# Patient Record
Sex: MALE | Race: BLACK OR AFRICAN AMERICAN | Employment: FULL TIME | URBAN - METROPOLITAN AREA
[De-identification: names, ages, dates, MRNs, and addresses within clinical notes are randomized per-mention and may not be internally consistent; named-entity substitution may affect disease eponyms.]

---

## 2020-04-29 ENCOUNTER — OFFICE VISIT - HEALTHEAST (OUTPATIENT)
Dept: FAMILY MEDICINE | Facility: CLINIC | Age: 25
End: 2020-04-29

## 2020-04-29 DIAGNOSIS — R11.0 NAUSEA: ICD-10-CM

## 2020-04-29 DIAGNOSIS — R10.31 RLQ ABDOMINAL PAIN: ICD-10-CM

## 2020-04-29 LAB
ALBUMIN UR-MCNC: NEGATIVE MG/DL
APPEARANCE UR: CLEAR
BACTERIA #/AREA URNS HPF: ABNORMAL HPF
BASOPHILS # BLD AUTO: 0 THOU/UL (ref 0–0.2)
BASOPHILS NFR BLD AUTO: 1 % (ref 0–2)
BILIRUB UR QL STRIP: NEGATIVE
COLOR UR AUTO: YELLOW
EOSINOPHIL # BLD AUTO: 0.1 THOU/UL (ref 0–0.4)
EOSINOPHIL NFR BLD AUTO: 1 % (ref 0–6)
ERYTHROCYTE [DISTWIDTH] IN BLOOD BY AUTOMATED COUNT: 11.1 % (ref 11–14.5)
GLUCOSE UR STRIP-MCNC: NEGATIVE MG/DL
HCT VFR BLD AUTO: 46.1 % (ref 40–54)
HGB BLD-MCNC: 16.1 G/DL (ref 14–18)
HGB UR QL STRIP: NEGATIVE
KETONES UR STRIP-MCNC: NEGATIVE MG/DL
LEUKOCYTE ESTERASE UR QL STRIP: ABNORMAL
LYMPHOCYTES # BLD AUTO: 2.6 THOU/UL (ref 0.8–4.4)
LYMPHOCYTES NFR BLD AUTO: 49 % (ref 20–40)
MCH RBC QN AUTO: 32.3 PG (ref 27–34)
MCHC RBC AUTO-ENTMCNC: 34.9 G/DL (ref 32–36)
MCV RBC AUTO: 92 FL (ref 80–100)
MONOCYTES # BLD AUTO: 0.4 THOU/UL (ref 0–0.9)
MONOCYTES NFR BLD AUTO: 8 % (ref 2–10)
NEUTROPHILS # BLD AUTO: 2.2 THOU/UL (ref 2–7.7)
NEUTROPHILS NFR BLD AUTO: 41 % (ref 50–70)
NITRATE UR QL: NEGATIVE
PH UR STRIP: 6 [PH] (ref 5–8)
PLATELET # BLD AUTO: 237 THOU/UL (ref 140–440)
PMV BLD AUTO: 7.2 FL (ref 7–10)
RBC # BLD AUTO: 4.99 MILL/UL (ref 4.4–6.2)
RBC #/AREA URNS AUTO: ABNORMAL HPF
SP GR UR STRIP: 1.01 (ref 1–1.03)
SQUAMOUS #/AREA URNS AUTO: ABNORMAL LPF
UROBILINOGEN UR STRIP-ACNC: ABNORMAL
WBC #/AREA URNS AUTO: ABNORMAL HPF
WBC: 5.3 THOU/UL (ref 4–11)

## 2020-04-30 LAB — BACTERIA SPEC CULT: NO GROWTH

## 2021-05-01 ENCOUNTER — HEALTH MAINTENANCE LETTER (OUTPATIENT)
Age: 26
End: 2021-05-01

## 2021-06-04 VITALS
DIASTOLIC BLOOD PRESSURE: 83 MMHG | SYSTOLIC BLOOD PRESSURE: 133 MMHG | WEIGHT: 180 LBS | TEMPERATURE: 97.9 F | RESPIRATION RATE: 16 BRPM | OXYGEN SATURATION: 98 % | HEART RATE: 85 BPM

## 2021-06-18 NOTE — PATIENT INSTRUCTIONS - HE
Patient Instructions by Vincenzo Okeefe CNP at 4/29/2020  8:00 AM     Author: Vincenzo Okeefe CNP Service: -- Author Type: Nurse Practitioner    Filed: 4/29/2020  8:41 AM Encounter Date: 4/29/2020 Status: Signed    : Vincenzo Okeefe CNP (Nurse Practitioner)         Patient Education     Abdominal Pain, Possible Appendicitis, Repeat Exam, Male    Based on your visit today, the exact cause of your abdominal (stomach) pain is not certain. You have some of the early symptoms of appendicitis. Because these symptoms can be similar to other stomach problems, however, the diagnosis cannot be made at this time.  Waiting for more time to pass and repeating the exam is the best way to find out whether you have appendicitis. Within the next 12 to 24 hours, the cause of your stomach pain should become clear. During this time, you need to watch for any new symptoms or worsening of your condition. (See below.)  Symptoms  The most common symptom of appendicitis is pain in the abdomen. Since the appendix is in the lower right part of the abdomen, that is the most common place to have pain. Typically, the pain starts near the navel (belly button) and then moves lower and to the right over hours. The pain also tends to worsen over time. It may hurt especially when moving, straining, or coughing.  Other symptoms include:    Loss of appetite    Nausea, vomiting    Low-grade fever    Constipation or diarrhea    Not passing gas  Home care    Rest until your next exam. No lifting, straining, or strenuous activities.    You may be told not to eat or drink anything before your next exam. Be sure to follow any instructions youre given. If you are allowed to eat:  ? Eat a diet low in fiber (called a low-residue diet). Foods allowed include refined breads, white rice, fruit and vegetable juices without pulp, and tender meats. These foods will pass more easily through the colon.  ? Don't eat whole-grain foods, whole fruits and  vegetables, meats, seeds and nuts, fried or fatty foods, dairy, alcohol, or spicy foods.   Follow-up care  Return for your next exam as advised.  When to seek medical advice  Call your healthcare provider treatment right away if any of these occur:    Fever of 100.4 F (38 C) or higher, or as directed by your provider    Pain gets worse or moves to the lower right part of your abdomen    Nausea or vomiting gets worse    Diarrhea or constipation gets worse    Blood in your vomit or stool (dark red or black color)    Belly (abdomen) becomes swollen    Weakness or dizziness  Call 911  Call 911 right away if:    You have trouble breathing.    You become very confused or sleepy.    You feel faint or lose consciousness.    You have an usually fast heart rate.  Date Last Reviewed: 6/24/2015 2000-2017 [employer]. [address] [place] [address], [place] [address]. All rights reserved. This information is not intended as a substitute for professional medical care. Always follow your healthcare professional's instructions.

## 2021-06-29 NOTE — PROGRESS NOTES
Progress Notes by Vincenzo Okeefe CNP at 4/29/2020  8:00 AM     Author: Vincenzo Okeefe CNP Service: -- Author Type: Nurse Practitioner    Filed: 4/29/2020  9:04 AM Encounter Date: 4/29/2020 Status: Signed    : Vincenzo Okeefe CNP (Nurse Practitioner)       Chief Complaint   Patient presents with   ? Abdominal Pain     on and off since around 4-5 am today, pain starts by belly buttom and radiates to sternum while lying on stomach       HPI:  Jorge Alberto Solorio is a 25 y.o. male who presents today complaining of severe abdominal pain that began early morning hours today, awaking him from sleep, he rates this discomfort a 6-7/10 on the numeric pain scale. He describes the pain as a stabbing, shooting pain.     Reports he normally sleeps on abdomen and pain has continued to radiate from umbilicus to left and right lower quadrant and upwards to epigastric area. Reports intermittent nausea this morning, no emesis. Denies any OTC medications for discomfort.     Reports no known abdominal surgeries or injuries. Past medical history negative for chronic abdominal concerns. Denies any hematemesis or hemeatochezia symptoms.      History obtained from the patient.    Problem List:  There are no relevant problems documented for this patient.      History reviewed. No pertinent past medical history.    Social History     Tobacco Use   ? Smoking status: Never Smoker   ? Smokeless tobacco: Never Used   Substance Use Topics   ? Alcohol use: Not on file       Review of Systems   Constitutional: Positive for appetite change. Negative for activity change, chills and fever.   Gastrointestinal: Positive for abdominal distention, abdominal pain, constipation and nausea. Negative for blood in stool, diarrhea and vomiting.   Genitourinary: Negative for difficulty urinating and dysuria.   All other systems reviewed and are negative.      Vitals:    04/29/20 0746   BP: 133/83   Patient Site: Right Arm   Patient Position: Sitting    Cuff Size: Adult Regular   Pulse: 85   Resp: 16   Temp: 97.9  F (36.6  C)   TempSrc: Oral   SpO2: 98%   Weight: 180 lb (81.6 kg)       Physical Exam  Constitutional:       General: He is not in acute distress.     Appearance: Normal appearance. He is not ill-appearing, toxic-appearing or diaphoretic.   Cardiovascular:      Rate and Rhythm: Normal rate and regular rhythm.      Pulses: Normal pulses.      Heart sounds: Normal heart sounds.   Pulmonary:      Effort: Pulmonary effort is normal.      Breath sounds: Normal breath sounds.   Abdominal:      General: Bowel sounds are normal. There is distension.      Palpations: Abdomen is soft. There is no mass.      Tenderness: There is abdominal tenderness. There is guarding. There is no right CVA tenderness, left CVA tenderness or rebound.      Comments: Localized tenderness to RLQ and radiates to umbilicus with light palpation, slight guarding with deep palpation   Skin:     General: Skin is warm.      Capillary Refill: Capillary refill takes less than 2 seconds.      Coloration: Skin is not pale.      Findings: No rash.   Neurological:      General: No focal deficit present.      Mental Status: He is alert and oriented to person, place, and time. Mental status is at baseline.   Psychiatric:         Mood and Affect: Mood normal.         No notes on file    Labs:  Recent Results (from the past 72 hour(s))   Urinalysis-UC if Indicated   Result Value Ref Range    Color, UA Yellow Colorless, Yellow, Straw, Light Yellow    Clarity, UA Clear Clear    Glucose, UA Negative Negative    Bilirubin, UA Negative Negative    Ketones, UA Negative Negative    Specific Gravity, UA 1.010 1.005 - 1.030    Blood, UA Negative Negative    pH, UA 6.0 5.0 - 8.0    Protein, UA Negative Negative mg/dL    Urobilinogen, UA 0.2 E.U./dL 0.2 E.U./dL, 1.0 E.U./dL    Nitrite, UA Negative Negative    Leukocytes, UA Trace (!) Negative    Bacteria, UA None Seen None Seen hpf    RBC, UA None Seen None  Seen, 0-2 hpf    WBC, UA 0-5 None Seen, 0-5 hpf    Squam Epithel, UA None Seen None Seen, 0-5 lpf   HM1 (CBC with Diff)   Result Value Ref Range    WBC 5.3 4.0 - 11.0 thou/uL    RBC 4.99 4.40 - 6.20 mill/uL    Hemoglobin 16.1 14.0 - 18.0 g/dL    Hematocrit 46.1 40.0 - 54.0 %    MCV 92 80 - 100 fL    MCH 32.3 27.0 - 34.0 pg    MCHC 34.9 32.0 - 36.0 g/dL    RDW 11.1 11.0 - 14.5 %    Platelets 237 140 - 440 thou/uL    MPV 7.2 7.0 - 10.0 fL    Neutrophils % 41 (L) 50 - 70 %    Lymphocytes % 49 (H) 20 - 40 %    Monocytes % 8 2 - 10 %    Eosinophils % 1 0 - 6 %    Basophils % 1 0 - 2 %    Neutrophils Absolute 2.2 2.0 - 7.7 thou/uL    Lymphocytes Absolute 2.6 0.8 - 4.4 thou/uL    Monocytes Absolute 0.4 0.0 - 0.9 thou/uL    Eosinophils Absolute 0.1 0.0 - 0.4 thou/uL    Basophils Absolute 0.0 0.0 - 0.2 thou/uL       Radiology: None obtained    Clinical Decision Making: At the end of the encounter, I discussed laboratory results, concern for appendicitis or other acute intraabdominal process given clinical presentation. Patient advised to proceed to ED now for further evaluation. Wife to transport via personal car to [facility]. Patient understood and agreed to plan.       SAMUEL Stewart, CNP     1. RLQ abdominal pain  HM1(CBC and Differential)    Urinalysis-UC if Indicated    HM1 (CBC with Diff)    Culture, Urine   2. Nausea           Patient Instructions     Patient Education     Abdominal Pain, Possible Appendicitis, Repeat Exam, Male    Based on your visit today, the exact cause of your abdominal (stomach) pain is not certain. You have some of the early symptoms of appendicitis. Because these symptoms can be similar to other stomach problems, however, the diagnosis cannot be made at this time.  Waiting for more time to pass and repeating the exam is the best way to find out whether you have appendicitis. Within the next 12 to 24 hours, the cause of your stomach pain should become clear. During this time, you need  to watch for any new symptoms or worsening of your condition. (See below.)  Symptoms  The most common symptom of appendicitis is pain in the abdomen. Since the appendix is in the lower right part of the abdomen, that is the most common place to have pain. Typically, the pain starts near the navel (belly button) and then moves lower and to the right over hours. The pain also tends to worsen over time. It may hurt especially when moving, straining, or coughing.  Other symptoms include:    Loss of appetite    Nausea, vomiting    Low-grade fever    Constipation or diarrhea    Not passing gas  Home care    Rest until your next exam. No lifting, straining, or strenuous activities.    You may be told not to eat or drink anything before your next exam. Be sure to follow any instructions youre given. If you are allowed to eat:  ? Eat a diet low in fiber (called a low-residue diet). Foods allowed include refined breads, white rice, fruit and vegetable juices without pulp, and tender meats. These foods will pass more easily through the colon.  ? Don't eat whole-grain foods, whole fruits and vegetables, meats, seeds and nuts, fried or fatty foods, dairy, alcohol, or spicy foods.   Follow-up care  Return for your next exam as advised.  When to seek medical advice  Call your healthcare provider treatment right away if any of these occur:    Fever of 100.4 F (38 C) or higher, or as directed by your provider    Pain gets worse or moves to the lower right part of your abdomen    Nausea or vomiting gets worse    Diarrhea or constipation gets worse    Blood in your vomit or stool (dark red or black color)    Belly (abdomen) becomes swollen    Weakness or dizziness  Call 911  Call 911 right away if:    You have trouble breathing.    You become very confused or sleepy.    You feel faint or lose consciousness.    You have an usually fast heart rate.  Date Last Reviewed: 6/24/2015 2000-2017 [employer]. [address], [place] [address]. All rights reserved. This information is not intended as a substitute for professional medical care. Always follow your healthcare professional's instructions.

## 2021-09-15 ENCOUNTER — OFFICE VISIT (OUTPATIENT)
Dept: FAMILY MEDICINE | Facility: CLINIC | Age: 26
End: 2021-09-15
Payer: COMMERCIAL

## 2021-09-15 VITALS
RESPIRATION RATE: 16 BRPM | BODY MASS INDEX: 26.96 KG/M2 | HEART RATE: 72 BPM | WEIGHT: 182 LBS | HEIGHT: 69 IN | DIASTOLIC BLOOD PRESSURE: 70 MMHG | SYSTOLIC BLOOD PRESSURE: 128 MMHG | OXYGEN SATURATION: 98 % | TEMPERATURE: 97.2 F

## 2021-09-15 DIAGNOSIS — Z00.00 ROUTINE GENERAL MEDICAL EXAMINATION AT A HEALTH CARE FACILITY: Primary | ICD-10-CM

## 2021-09-15 PROCEDURE — 99395 PREV VISIT EST AGE 18-39: CPT | Performed by: FAMILY MEDICINE

## 2021-09-15 ASSESSMENT — ENCOUNTER SYMPTOMS
FEVER: 0
SHORTNESS OF BREATH: 0
COUGH: 0
HEMATURIA: 0
WEAKNESS: 0
CONSTIPATION: 0
CHILLS: 0
DYSURIA: 0
DIZZINESS: 0
NERVOUS/ANXIOUS: 0
PARESTHESIAS: 0
EYE PAIN: 0
ARTHRALGIAS: 0
SORE THROAT: 0
HEARTBURN: 0
DIARRHEA: 0
HEMATOCHEZIA: 0
JOINT SWELLING: 0
HEADACHES: 0
FREQUENCY: 0
ABDOMINAL PAIN: 0
PALPITATIONS: 0
NAUSEA: 0
MYALGIAS: 0

## 2021-09-15 ASSESSMENT — MIFFLIN-ST. JEOR: SCORE: 1794.89

## 2021-09-15 NOTE — PROGRESS NOTES
SUBJECTIVE:   CC: Jorge Alberto Solorio is an 26 year old male who presents for preventative health visit.       Patient has been advised of split billing requirements and indicates understanding: Yes  Healthy Habits:     Getting at least 3 servings of Calcium per day:  Yes    Bi-annual eye exam:  Yes    Dental care twice a year:  Yes    Sleep apnea or symptoms of sleep apnea:  None    Diet:  Regular (no restrictions)    Frequency of exercise:  4-5 days/week    Duration of exercise:  30-45 minutes    Taking medications regularly:  No    Medication side effects:  None    PHQ-2 Total Score: 0    Additional concerns today:  No       26-year-old male who presents to clinic for annual exam and to establish care.    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   He is an exercise physiologist.     Immunizations: will get flu vaccine through [employer].   Cholesterol: No family history of severely elevated cholesterol. Screen age 35   Diabetes: no indication to screen at this time.   Colon Cancer: No early family history of colon cancer.   HIV screen: Deferred low risk   Hepatitis C screen: Deferred low risk   Diet/Exercise: active.    Tobacco: non-user.     Alcohol: will drink a couple glasses of wine on the weekends. No issues.   Exercise: does cardio and also strength training.       Today's PHQ-2 Score:   PHQ-2 ( 1999 [employer]) 9/15/2021   Q1: Little interest or pleasure in doing things 0   Q2: Feeling down, depressed or hopeless 0   PHQ-2 Score 0   Q1: Little interest or pleasure in doing things Not at all   Q2: Feeling down, depressed or hopeless Not at all   PHQ-2 Score 0       Abuse: Current or Past(Physical, Sexual or Emotional)- No  Do you feel safe in your environment? Yes    Have you ever done Advance Care Planning? (For example, a Health Directive, POLST, or a discussion with a medical provider or your loved ones about your wishes): No, advance care planning information given to patient to  "review.  Patient plans to discuss their wishes with loved ones or provider.      Social History     Tobacco Use     Smoking status: Never Smoker     Smokeless tobacco: Never Used   Substance Use Topics     Alcohol use: Yes     Comment: occ     If you drink alcohol do you typically have >3 drinks per day or >7 drinks per week? No    Alcohol Use 9/15/2021   Prescreen: >3 drinks/day or >7 drinks/week? No   Prescreen: >3 drinks/day or >7 drinks/week? -     Reviewed orders with patient. Reviewed health maintenance and updated orders accordingly - Yes    Reviewed and updated as needed this visit by clinical staff  Tobacco  Allergies  Meds  Problems  Med Hx  Surg Hx  Fam Hx  Soc Hx          Reviewed and updated as needed this visit by Provider  Tobacco  Allergies  Meds  Problems  Med Hx  Surg Hx  Fam Hx         Review of Systems   Constitutional: Negative for chills and fever.   HENT: Negative for congestion, ear pain, hearing loss and sore throat.    Eyes: Negative for pain and visual disturbance.   Respiratory: Negative for cough and shortness of breath.    Cardiovascular: Negative for chest pain, palpitations and peripheral edema.   Gastrointestinal: Negative for abdominal pain, constipation, diarrhea, heartburn, hematochezia and nausea.   Genitourinary: Negative for discharge, dysuria, frequency, genital sores, hematuria, impotence and urgency.   Musculoskeletal: Negative for arthralgias, joint swelling and myalgias.   Skin: Negative for rash.   Neurological: Negative for dizziness, weakness, headaches and paresthesias.   Psychiatric/Behavioral: Negative for mood changes. The patient is not nervous/anxious.        OBJECTIVE:   /70 (BP Location: Left arm, Patient Position: Chair, Cuff Size: Adult Regular)   Pulse 72   Temp 97.2  F (36.2  C) (Tympanic)   Resp 16   Ht 1.759 m (5' 9.25\")   Wt 82.6 kg (182 lb)   SpO2 98%   BMI 26.68 kg/m      Physical Exam   General: Alert, cooperative, no acute " "distress  Head: Normocephalic, atraumatic   Eyes: PERRL, no scleral icterus, no conjunctival injection   Ears: External ear without deformity, external canals patent, TM intact with no signs of infection   Nose: nares patent  Throat: Oropharynx clear, non-erythematous, tonsils non-enlarged   Neck: supple, trachea midline, no goiter   CV: RRR, no murmur   Lungs: Clear, non-labored breathing, No rales or rhonchi   Abdomen: Bowel sounds active, soft, non-tender, no guarding.   Extremities: No peripheral edema, calves non-tender   MSK: strength intact in upper and lower extremities. Gait normal.   Neuro: CN II-XII grossly intact. No focal deficits. Sensation intact.       ASSESSMENT/PLAN:   Jorge Alberto was seen today for physical.    Diagnoses and all orders for this visit:    Routine general medical examination at a health care facility  Immunizations: will get flu vaccine through employee health.   Cholesterol: No family history of severely elevated cholesterol. Screen age 35   Diabetes: no indication to screen at this time.   Colon Cancer: No early family history of colon cancer.   HIV screen: Deferred low risk   Hepatitis C screen: Deferred low risk   Diet/Exercise: active.    Tobacco: non-user.     Patient has been advised of split billing requirements and indicates understanding: Yes  COUNSELING:   Reviewed preventive health counseling, as reflected in patient instructions       Regular exercise       Healthy diet/nutrition       Family planning       Safe sex practices/STD prevention       Consider Hep C screening for all patients one time for ages 18-79 years       HIV screeninx in teen years, 1x in adult years, and at intervals if high risk    Estimated body mass index is 26.68 kg/m  as calculated from the following:    Height as of this encounter: 1.759 m (5' 9.25\").    Weight as of this encounter: 82.6 kg (182 lb).     Weight management plan: Discussed healthy diet and exercise guidelines    He reports that " he has never smoked. He has never used smokeless tobacco.      Counseling Resources:  ATP IV Guidelines  Pooled Cohorts Equation Calculator  FRAX Risk Assessment  ICSI Preventive Guidelines  Dietary Guidelines for Americans, 2010  USDA's MyPlate  ASA Prophylaxis  Lung CA Screening    [facility]

## 2021-10-16 ENCOUNTER — HEALTH MAINTENANCE LETTER (OUTPATIENT)
Age: 26
End: 2021-10-16

## 2022-01-28 ENCOUNTER — VIRTUAL VISIT (OUTPATIENT)
Dept: FAMILY MEDICINE | Facility: CLINIC | Age: 27
End: 2022-01-28
Payer: COMMERCIAL

## 2022-01-28 DIAGNOSIS — U07.1 INFECTION DUE TO 2019 NOVEL CORONAVIRUS: Primary | ICD-10-CM

## 2022-01-28 PROCEDURE — 99213 OFFICE O/P EST LOW 20 MIN: CPT | Mod: 95 | Performed by: NURSE PRACTITIONER

## 2022-01-28 RX ORDER — BENZONATATE 100 MG/1
100 CAPSULE ORAL 3 TIMES DAILY PRN
Qty: 30 CAPSULE | Refills: 0 | Status: SHIPPED | OUTPATIENT
Start: 2022-01-28 | End: 2023-03-17

## 2022-01-28 NOTE — PROGRESS NOTES
Jorge Alberto is a 26 year old who is being evaluated via a billable video visit.      How would you like to obtain your AVS? MyChart  If the video visit is dropped, the invitation should be resent by: Text to cell phone: [phone]  Will anyone else be joining your video visit? No      Video Start Time: 11:01 AM    Assessment & Plan     Infection due to 2019 novel coronavirus  Symptomatic treatments discussed.  Return criteria discussed.  - benzonatate (TESSALON) 100 MG capsule; Take 1 capsule (100 mg) by mouth 3 times daily as needed for cough             Patient Instructions     Patient Education       Coronavirus Disease 2019 (COVID-19): Caring for Yourself or Others  If you or a household member test positive for COVID-19 or have symptoms like fever, cough, fatigue, loss of taste or smell, follow these guidelines for preventing spread of the virus and managing symptoms. This is regardless of your vaccination status.  If you have COVID-19 symptoms    Stay home. Call your healthcare provider and tell them you have COVID-19 symptoms or you have tested positive for COVID-19. Do this before going to any hospital or clinic. Follow your provider's instructions. You will be advised to isolate yourself at home. This is called self-isolation. Isolate even if you don't have COVID-19 symptoms but you test positive. See the CDC's website for current, detailed information about staying home. Also, follow your local area's instructions on testing and staying home.    Stay away from work, school, and public places. Limit physical contact with family members. Limit visitors. Don't kiss anyone or share eating or drinking utensils. Clean surfaces you touch with disinfectant. This is to help prevent the virus from spreading.    If you need to cough or sneeze, do it into a tissue. Then throw the tissue into the trash. If you don't have tissues, cough or sneeze into the bend of your elbow.    Wear a cloth face mask with 2 or more  layers of washable, breathable fabric and a nose wire while in public or when indoors with people who don't live with you. Or you can wear a disposable paper mask with a cloth mask on top. Wear the mask so that it covers both your nose, mouth, and chin. Make sure the mask is snug around your nose and the sides of your face.    Don t share food or personal items with people in your household. This includes items like eating and drinking utensils, towels, and bedding.    If you need to go to a hospital or clinic, expect that the healthcare staff will wear protective equipment such as masks, gowns, gloves, and eye protection. You may be advised to wait in or enter through a separate area. This is to prevent the possible virus from spreading.    Tell the healthcare staff about recent travel. This includes local travel on public transport. Staff may need to find other people you have been in contact with.    Follow all instructions the healthcare staff give you.    If you have been diagnosed with COVID-19    Stay home and isolate. Don t leave your home unless you need to get medical care. Don't go to work, school, or public areas. Don't use public transportation or taxis.    Follow all instructions from your healthcare provider. Call your healthcare provider s office before going. They can prepare and give you instructions. This will help prevent the virus from spreading.    If you need to go to a hospital or clinic, expect that the healthcare staff will wear protective equipment such as masks, gowns, gloves, and eye protection. You may be advised to wait in or enter through a separate area. This is to prevent the possible virus from spreading.    Wear a well-fitted face mask with 2 or more layers and a nose wire. Use either a cloth mask with layers of tightly woven, breathable fabric or a disposable paper mask with a cloth mask on top. This is to protect other people from your germs. If you are not able to wear a mask,  your caregivers should. Make sure the mask is snug around your nose and the sides of your face.    Stay away from other people in your home. Stay in a separate room with its own bathroom, if possible.    Stay away from pets and other animals.    Don t share food or personal items with people in your household. This includes items like eating and drinking utensils, towels, and bedding.    If you need to cough or sneeze, do it into a tissue. Then throw the tissue into the trash. If you don't have tissues, cough or sneeze into the bend of your elbow.    Wash your hands often.    Self-care at home   Prevention  Several vaccines and booster shots are available to prevent COVID-19 or reduce its severity. These vaccines reduce how severe the illness will be if you get the virus. No vaccine is ever 100% effective in preventing any illness, but the COVID-19 vaccines work well and are safe. One vaccine is available for people as young as 5. Booster shots are available for people as young as 12. Expert groups, including ACOG and CDC, advise pregnant or breastfeeding people to be vaccinated. Talk with your healthcare provider about which vaccine is best for you and your family and when you may need a booster shot.  Treatment  Current treatment is mainly aimed at helping your body while it fights the virus. This is known as supportive care. If you have confirmed COVID-19, talk with your healthcare provider. You may qualify for certain medicines approved by the FDA to prevent severe COVID-19 infection.  For serious COVID-19, you may need to stay in the hospital. Supportive care includes:    Getting rest. This helps your body fight the illness.    Staying hydrated.  Drinking liquids is the best way to prevent dehydration. Try to drink 6 to 8 glasses of liquids every day, or as advised by your provider. Also check with your provider about which fluids are best for you. Don't drink fluids that contain caffeine or alcohol.    Taking  over-the-counter (OTC) pain medicine. These are used to help ease pain and reduce fever. Follow your healthcare provider's instructions for which OTC medicine to use.  If you've been treated for suspected or confirmed COVID-19, follow all of your healthcare team's instructions. This will include when it's OK to stop self-isolation. You may also get instructions on position changes to help your breathing, such as lying on your belly (prone positioning). If you were treated at a hospital and discharged, you may be sent home with a pulse oximeter. This is a small electronic device that you clip on your fingertip. It measures the amount of oxygen in your body. Follow your healthcare team's instructions on its use, how they will be in touch with you, and let you know when to call them.  The FDA approved monoclonal antibody therapy for emergency investigational use in certain people who have a positive COVID-19 viral test and have mild to moderate symptoms but are not in the hospital. Your healthcare provider will advise you on whether monoclonal antibody therapy is appropriate for you. It's not approved to prevent COVID-19. It's approved for people 12 years and older who weigh at least 88 pounds (about 40 kgs) and are at high risk for severe COVID-19 and a hospital stay. This includes people who are 65 years and older and people with certain chronic conditions. Monoclonal antibody therapy is not approved for people who:    Are in the hospital with COVID-19, or    Need oxygen therapy for COVID-19, or    Need oxygen therapy for a chronic condition and need to have oxygen flow increased because of COVID-19  If you've had confirmed COVID-19, your healthcare team may ask you to consider donating your plasma. This is called COVID-19 convalescent plasma donation. Plasma from people fully recovered from COVID-19 may contain antibodies to help fight COVID-19 in people who are currently seriously ill with the disease. Experts  don't know the safety of COVID-19 convalescent plasma or how well it works. Research continues. The FDA has approved it for emergency use in certain people with serious or life-threatening COVID-19.  Home care for a sick person     Follow all instructions from healthcare staff.    Wash your hands often.    Wear protective clothing as advised.    Make sure the sick person wears a mask. If they can't wear a mask, don't stay in the same room with the person. If you must be in the same room, wear a face mask. When wearing a mask, make sure that it covers both the nose and mouth.    Keep track of the sick person s symptoms.    Clean home surfaces often with disinfectant. This includes phones, kitchen counters, fridge door handle, bathroom surfaces, and others.    Don t let anyone share household items with the sick person. This includes eating and drinking tools, towels, sheets, or blankets.    Clean fabrics and laundry thoroughly.    Keep other people and pets away from the sick person.    When you can stop isolation  When you have COVID-19, with or without symptoms, you should stay away from other people. This is called isolation.  For normally healthy children or adults with COVID-19 symptoms, CDC advises stopping isolation when all 3 of these are true:  1. You have had no fever for at least 24 hours. This means no fever without medicine that reduces fever, such as acetaminophen, for at least 24 hours.  2. If you have symptoms, your symptoms such as cough or trouble breathing have improved.  3. It has been at least 5 days since your first symptoms started.  For people who have COVID-19 but no symptoms, isolation can stop 5 days after the first positive test. To calculate your 5-day isolation period, day 0 is your first day of symptoms. Day 1 is the first full day after your symptoms developed. You can leave isolation after 5 full days.  People who have tested positive (even without symptoms) should wear a well-fitting  mask 5 more days after leaving isolation. This is day 6 through day 10.  If you have a weak immune system and COVID-19, or if you've had severe COVID-19,  your instructions on when to stop isolation will be somewhat different. Some conditions and treatments can cause a weak immune system. These include cancer treatment, bone marrow or organ transplants, and conditions such as HIV or other immune system disorders. You may be advised to self-isolate up to 20 days after your symptoms first started. Your healthcare provider may want to retest you for COVID-19. Follow your provider's instructions.  CDC mask guidance  Consider the CDC's guidance and your local community's instructions on face masks.    Wear a well-fitting mask that is snug around your nose, mouth, and chin.    Choose a mask with a nose wire. Bend the wire over your nose to fit close to your face. This prevents air from leaking out of the top of the mask.    Cloth masks may help prevent people who have COVID-19 from spreading the virus to others.    Cloth masks are most likely to reduce COVID-19 spread when masks are widely used by people who are out in the public.    Use 2 layers of material for your mask. Wear a cloth mask that has multiple layers of fabric or wear a disposable mask underneath a cloth mask.    Wear a mask inside your house if you live with someone who has symptoms of COVID-19 or has tested positive for COVID-19.    CDC advises all people older than 2 who are not fully vaccinated to wear a mask and stay 6 feet away from others while in public.    CDC advises people with a weak immune system, even if fully vaccinated, to continue wearing masks and to stay 6 feet away from others while in public.    In  through grade 12 classes, CDC advises indoor masking for all teachers, staff, students ages 2 and older, and visitors to schools, regardless of vaccination status.    Like other viruses, the virus that causes COVID-19 changes  (mutates) all the time. This leads to variants that are easily spread, including the delta variant. To protect against variants, CDC advises all people over age 2, including those who are fully vaccinated, to wear a mask indoors in public settings if you are in an area of high numbers of COVID-19 cases. See the [facility]'s county data website for current transmission information in your area.  Certain people should not wear a face mask. This includes:    Children younger than 2 years old    Anyone with a health, developmental, or mental health condition that can be made worse by wearing a mask    Anyone who is unconscious or unable to remove the face covering without help  See the CDC's mask guidance.  When to call your healthcare provider  Call your healthcare provider right away if a sick person has any of these:    Trouble breathing    Pain or pressure in chest  If a sick person has any of these, call 911:    Trouble breathing that gets worse    Pain or pressure in chest that gets worse    Blue tint to lips or face    Fast or irregular heartbeat    Confusion or trouble waking    Fainting or loss of consciousness    Coughing up blood  Going home from the hospital  If you were diagnosed with COVID-19 and were recently discharged from the hospital:    Follow the instructions above for self-care and isolation.    Follow the hospital healthcare team s specific instructions.    Ask questions if anything is unclear to you. Write down answers so you remember them.  Date last modified: 1/7/2022  [place] last reviewed this educational content on 12/1/2021 2000-2022 [employer]. All rights reserved. This information is not intended as a substitute for professional medical care. Always follow your healthcare professional's instructions.               The risks, benefits and treatment options of prescribed medications or other treatments have been discussed with the patient. The patient verbalized their  understanding and should call or follow up if no improvement or if they develop further problems.    SAMUEL Nayak CNP  [facility] VAMSHI Azul is a 26 year old who presents for the following health issues     HPI       Concern for COVID-19  +covid test on Tuesday (home test)  He is more concerned about persistent cough he has had since 1/18/2022  About how many days ago did these symptoms start? 5 days  Is this your first visit for this illness? Yes  In the 14 days before your symptoms started, have you had close contact with someone with COVID-19 (Coronavirus)? No  Do you have a fever or chills? No  Are you having new or worsening difficulty breathing? No  Do you have new or worsening cough? Yes, it's a dry cough mostly;  A little productive in the morning  Have you had any new or unexplained body aches? YES  - improved  Have you experienced any of the following NEW symptoms?    Headache: YES a little bit    Sore throat: No    Loss of taste or smell: No    Chest pain: YES a little bit with cough    Diarrhea: No    Rash: No  What treatments have you tried? dayquil and mucinex-  Helps him sleep a little bit.  Who do you live with? 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 No  Do you have a way to get food/medications if quarantined? Yes, I have a friend or family member who can help me.      HAS HAD 3 DOSES OF COVID VACCINE.              Review of Systems   Constitutional, HEENT, cardiovascular, pulmonary, gi and gu systems are negative, except as otherwise noted.      Objective           Vitals:  No vitals were obtained today due to virtual visit.    Physical Exam   GENERAL: Healthy, alert and no distress  EYES: Eyes grossly normal to inspection.  No discharge or erythema, or obvious scleral/conjunctival abnormalities.  RESP: No audible wheeze, cough, or  visible cyanosis.  No visible retractions or increased work of breathing.    SKIN: Visible skin clear. No significant rash, abnormal pigmentation or lesions.  NEURO: Cranial nerves grossly intact.  Mentation and speech appropriate for age.  PSYCH: Mentation appears normal, affect normal/bright, judgement and insight intact, normal speech and appearance well-groomed.                Video-Visit Details    Type of service:  Video Visit    Video End Time:11:10 AM    Originating Location (pt. Location): Home    Distant Location (provider location):  [facility]     Platform used for Video Visit: [employer]

## 2022-01-28 NOTE — PATIENT INSTRUCTIONS
Patient Education       Coronavirus Disease 2019 (COVID-19): Caring for Yourself or Others  If you or a household member test positive for COVID-19 or have symptoms like fever, cough, fatigue, loss of taste or smell, follow these guidelines for preventing spread of the virus and managing symptoms. This is regardless of your vaccination status.  If you have COVID-19 symptoms    Stay home. Call your healthcare provider and tell them you have COVID-19 symptoms or you have tested positive for COVID-19. Do this before going to any hospital or clinic. Follow your provider's instructions. You will be advised to isolate yourself at home. This is called self-isolation. Isolate even if you don't have COVID-19 symptoms but you test positive. See the CDC's website for current, detailed information about staying home. Also, follow your local area's instructions on testing and staying home.    Stay away from work, school, and public places. Limit physical contact with family members. Limit visitors. Don't kiss anyone or share eating or drinking utensils. Clean surfaces you touch with disinfectant. This is to help prevent the virus from spreading.    If you need to cough or sneeze, do it into a tissue. Then throw the tissue into the trash. If you don't have tissues, cough or sneeze into the bend of your elbow.    Wear a cloth face mask with 2 or more layers of washable, breathable fabric and a nose wire while in public or when indoors with people who don't live with you. Or you can wear a disposable paper mask with a cloth mask on top. Wear the mask so that it covers both your nose, mouth, and chin. Make sure the mask is snug around your nose and the sides of your face.    Don t share food or personal items with people in your household. This includes items like eating and drinking utensils, towels, and bedding.    If you need to go to a hospital or clinic, expect that the healthcare staff will wear protective equipment such as  masks, gowns, gloves, and eye protection. You may be advised to wait in or enter through a separate area. This is to prevent the possible virus from spreading.    Tell the healthcare staff about recent travel. This includes local travel on public transport. Staff may need to find other people you have been in contact with.    Follow all instructions the healthcare staff give you.    If you have been diagnosed with COVID-19    Stay home and isolate. Don t leave your home unless you need to get medical care. Don't go to work, school, or public areas. Don't use public transportation or taxis.    Follow all instructions from your healthcare provider. Call your healthcare provider s office before going. They can prepare and give you instructions. This will help prevent the virus from spreading.    If you need to go to a hospital or clinic, expect that the healthcare staff will wear protective equipment such as masks, gowns, gloves, and eye protection. You may be advised to wait in or enter through a separate area. This is to prevent the possible virus from spreading.    Wear a well-fitted face mask with 2 or more layers and a nose wire. Use either a cloth mask with layers of tightly woven, breathable fabric or a disposable paper mask with a cloth mask on top. This is to protect other people from your germs. If you are not able to wear a mask, your caregivers should. Make sure the mask is snug around your nose and the sides of your face.    Stay away from other people in your home. Stay in a separate room with its own bathroom, if possible.    Stay away from pets and other animals.    Don t share food or personal items with people in your household. This includes items like eating and drinking utensils, towels, and bedding.    If you need to cough or sneeze, do it into a tissue. Then throw the tissue into the trash. If you don't have tissues, cough or sneeze into the bend of your elbow.    Wash your hands  often.    Self-care at home   Prevention  Several vaccines and booster shots are available to prevent COVID-19 or reduce its severity. These vaccines reduce how severe the illness will be if you get the virus. No vaccine is ever 100% effective in preventing any illness, but the COVID-19 vaccines work well and are safe. One vaccine is available for people as young as 5. Booster shots are available for people as young as 12. Expert groups, including ACOG and CDC, advise pregnant or breastfeeding people to be vaccinated. Talk with your healthcare provider about which vaccine is best for you and your family and when you may need a booster shot.  Treatment  Current treatment is mainly aimed at helping your body while it fights the virus. This is known as supportive care. If you have confirmed COVID-19, talk with your healthcare provider. You may qualify for certain medicines approved by the FDA to prevent severe COVID-19 infection.  For serious COVID-19, you may need to stay in the hospital. Supportive care includes:    Getting rest. This helps your body fight the illness.    Staying hydrated.  Drinking liquids is the best way to prevent dehydration. Try to drink 6 to 8 glasses of liquids every day, or as advised by your provider. Also check with your provider about which fluids are best for you. Don't drink fluids that contain caffeine or alcohol.    Taking over-the-counter (OTC) pain medicine. These are used to help ease pain and reduce fever. Follow your healthcare provider's instructions for which OTC medicine to use.  If you've been treated for suspected or confirmed COVID-19, follow all of your healthcare team's instructions. This will include when it's OK to stop self-isolation. You may also get instructions on position changes to help your breathing, such as lying on your belly (prone positioning). If you were treated at a hospital and discharged, you may be sent home with a pulse oximeter. This is a small  electronic device that you clip on your fingertip. It measures the amount of oxygen in your body. Follow your healthcare team's instructions on its use, how they will be in touch with you, and let you know when to call them.  The FDA approved monoclonal antibody therapy for emergency investigational use in certain people who have a positive COVID-19 viral test and have mild to moderate symptoms but are not in the hospital. Your healthcare provider will advise you on whether monoclonal antibody therapy is appropriate for you. It's not approved to prevent COVID-19. It's approved for people 12 years and older who weigh at least 88 pounds (about 40 kgs) and are at high risk for severe COVID-19 and a hospital stay. This includes people who are 65 years and older and people with certain chronic conditions. Monoclonal antibody therapy is not approved for people who:    Are in the hospital with COVID-19, or    Need oxygen therapy for COVID-19, or    Need oxygen therapy for a chronic condition and need to have oxygen flow increased because of COVID-19  If you've had confirmed COVID-19, your healthcare team may ask you to consider donating your plasma. This is called COVID-19 convalescent plasma donation. Plasma from people fully recovered from COVID-19 may contain antibodies to help fight COVID-19 in people who are currently seriously ill with the disease. Experts don't know the safety of COVID-19 convalescent plasma or how well it works. Research continues. The FDA has approved it for emergency use in certain people with serious or life-threatening COVID-19.  Home care for a sick person     Follow all instructions from healthcare staff.    Wash your hands often.    Wear protective clothing as advised.    Make sure the sick person wears a mask. If they can't wear a mask, don't stay in the same room with the person. If you must be in the same room, wear a face mask. When wearing a mask, make sure that it covers both the nose  and mouth.    Keep track of the sick person s symptoms.    Clean home surfaces often with disinfectant. This includes phones, kitchen counters, fridge door handle, bathroom surfaces, and others.    Don t let anyone share household items with the sick person. This includes eating and drinking tools, towels, sheets, or blankets.    Clean fabrics and laundry thoroughly.    Keep other people and pets away from the sick person.    When you can stop isolation  When you have COVID-19, with or without symptoms, you should stay away from other people. This is called isolation.  For normally healthy children or adults with COVID-19 symptoms, CDC advises stopping isolation when all 3 of these are true:  1. You have had no fever for at least 24 hours. This means no fever without medicine that reduces fever, such as acetaminophen, for at least 24 hours.  2. If you have symptoms, your symptoms such as cough or trouble breathing have improved.  3. It has been at least 5 days since your first symptoms started.  For people who have COVID-19 but no symptoms, isolation can stop 5 days after the first positive test. To calculate your 5-day isolation period, day 0 is your first day of symptoms. Day 1 is the first full day after your symptoms developed. You can leave isolation after 5 full days.  People who have tested positive (even without symptoms) should wear a well-fitting mask 5 more days after leaving isolation. This is day 6 through day 10.  If you have a weak immune system and COVID-19, or if you've had severe COVID-19,  your instructions on when to stop isolation will be somewhat different. Some conditions and treatments can cause a weak immune system. These include cancer treatment, bone marrow or organ transplants, and conditions such as HIV or other immune system disorders. You may be advised to self-isolate up to 20 days after your symptoms first started. Your healthcare provider may want to retest you for COVID-19. Follow  your provider's instructions.  CDC mask guidance  Consider the CDC's guidance and your local community's instructions on face masks.    Wear a well-fitting mask that is snug around your nose, mouth, and chin.    Choose a mask with a nose wire. Bend the wire over your nose to fit close to your face. This prevents air from leaking out of the top of the mask.    Cloth masks may help prevent people who have COVID-19 from spreading the virus to others.    Cloth masks are most likely to reduce COVID-19 spread when masks are widely used by people who are out in the public.    Use 2 layers of material for your mask. Wear a cloth mask that has multiple layers of fabric or wear a disposable mask underneath a cloth mask.    Wear a mask inside your house if you live with someone who has symptoms of COVID-19 or has tested positive for COVID-19.    CDC advises all people older than 2 who are not fully vaccinated to wear a mask and stay 6 feet away from others while in public.    CDC advises people with a weak immune system, even if fully vaccinated, to continue wearing masks and to stay 6 feet away from others while in public.    In  through grade 12 classes, CDC advises indoor masking for all teachers, staff, students ages 2 and older, and visitors to schools, regardless of vaccination status.    Like other viruses, the virus that causes COVID-19 changes (mutates) all the time. This leads to variants that are easily spread, including the delta variant. To protect against variants, CDC advises all people over age 2, including those who are fully vaccinated, to wear a mask indoors in public settings if you are in an area of high numbers of COVID-19 cases. See the CDC's county data website for current transmission information in your area.  Certain people should not wear a face mask. This includes:    Children younger than 2 years old    Anyone with a health, developmental, or mental health condition that can be made  worse by wearing a mask    Anyone who is unconscious or unable to remove the face covering without help  See the CDC's mask guidance.  When to call your healthcare provider  Call your healthcare provider right away if a sick person has any of these:    Trouble breathing    Pain or pressure in chest  If a sick person has any of these, call 911:    Trouble breathing that gets worse    Pain or pressure in chest that gets worse    Blue tint to lips or face    Fast or irregular heartbeat    Confusion or trouble waking    Fainting or loss of consciousness    Coughing up blood  Going home from the hospital  If you were diagnosed with COVID-19 and were recently discharged from the hospital:    Follow the instructions above for self-care and isolation.    Follow the hospital healthcare team s specific instructions.    Ask questions if anything is unclear to you. Write down answers so you remember them.  Date last modified: 1/7/2022  [employer] last reviewed this educational content on 12/1/2021 2000-2022 [employer]. All rights reserved. This information is not intended as a substitute for professional medical care. Always follow your healthcare professional's instructions.

## 2022-09-25 ENCOUNTER — HEALTH MAINTENANCE LETTER (OUTPATIENT)
Age: 27
End: 2022-09-25

## 2022-10-06 ENCOUNTER — VIRTUAL VISIT (OUTPATIENT)
Dept: FAMILY MEDICINE | Facility: CLINIC | Age: 27
End: 2022-10-06
Payer: COMMERCIAL

## 2022-10-06 DIAGNOSIS — U07.1 INFECTION DUE TO 2019 NOVEL CORONAVIRUS: Primary | ICD-10-CM

## 2022-10-06 PROCEDURE — 99213 OFFICE O/P EST LOW 20 MIN: CPT | Mod: CS | Performed by: INTERNAL MEDICINE

## 2022-10-06 ASSESSMENT — PAIN SCALES - GENERAL: PAINLEVEL: NO PAIN (0)

## 2022-10-06 NOTE — PROGRESS NOTES
Jorge Alberto is a 27 year old who is being evaluated via a billable video visit.      How would you like to obtain your AVS? MyChart  If the video visit is dropped, the invitation should be resent by: Text to cell phone: [phone]  Will anyone else be joining your video visit? No        Assessment & Plan   Problem List Items Addressed This Visit    None     Visit Diagnoses     Infection due to 2019 novel coronavirus    -  Primary    Relevant Medications    nirmatrelvir and ritonavir (PAXLOVID) therapy pack           No follow-ups on file.    Jailyn De La Paz DO  [facility]    Nikko Azul is a 27 year old accompanied by his self, presenting for the following health issues:  Covid Concern (Took covid test Monday at home and was positive , ) and Health Maintenance (Aware due for shots )      HPI     Chief Complaint   Patient presents with     Covid Concern     Took covid test Monday at home and was positive ,      Health Maintenance     Aware due for shots        COVID-19 Symptom Review  How many days ago did these symptoms start? 10/2    Are any of the following symptoms significant for you?    New or worsening difficulty breathing? No    Worsening cough? Yes, I am coughing up mucus.at night and dry during the day    Fever or chills? No    Headache: No    Sore throat: YES- from cough    Chest pain: No    Diarrhea: No    Body aches? No    What treatments has patient tried? Guaifenesin (mucinex) and Cough syrup tessalon capsules this is not working   Does patient live in a nursing home, group home, or shelter? No  Does patient have a way to get food/medications during quarantined? Yes, I have a friend or family member who can help me.  --son was sick with URI 10 days ago;  He had similar 'tickle in throat 1 week ago, nothing severe  --on 10/2 started having symptoms of cough and fever             No flowsheet data found.    Estimated body mass index is 26.68 kg/m  as calculated from  "the following:    Height as of 9/15/21: 1.759 m (5' 9.25\").    Weight as of 9/15/21: 82.6 kg (182 lb).     GFR Estimate   Date Value Ref Range Status   04/29/2020 >60 >60 mL/min/1.73m2 Final        FDA Facts Sheet  [employer] Drug Interaction review    Medications were reviewed with the patient and held or adjusted where applicable.    Current Outpatient Medications   Medication     benzonatate (TESSALON) 100 MG capsule     No current facility-administered medications for this visit.         -               Review of Systems   Constitutional, HEENT, cardiovascular, pulmonary, gi and gu systems are negative, except as otherwise noted.      Objective    Vitals - Patient Reported  Pain Score: No Pain (0)      Vitals:  No vitals were obtained today due to virtual visit.    Physical Exam   GENERAL: alert, no distress, fatigued and sounds congested  EYES: Eyes grossly normal to inspection.  No discharge or erythema, or obvious scleral/conjunctival abnormalities.  RESP: No audible wheeze, cough, or visible cyanosis.  No visible retractions or increased work of breathing.    SKIN: Visible skin clear. No significant rash, abnormal pigmentation or lesions.  NEURO: Cranial nerves grossly intact.  Mentation and speech appropriate for age.  PSYCH: Mentation appears normal, affect normal/bright, judgement and insight intact, normal speech and appearance well-groomed.                Video-Visit Details    Video Start Time: 8:14 AM    Type of service:  Video Visit    Video End Time:8:24 AM    Originating Location (pt. Location): Home    Distant Location (provider location):  [facility]     Platform used for Video Visit: Marge"

## 2022-10-25 ENCOUNTER — OFFICE VISIT (OUTPATIENT)
Dept: URGENT CARE | Facility: URGENT CARE | Age: 27
End: 2022-10-25
Payer: COMMERCIAL

## 2022-10-25 VITALS
SYSTOLIC BLOOD PRESSURE: 123 MMHG | TEMPERATURE: 98.6 F | HEART RATE: 75 BPM | DIASTOLIC BLOOD PRESSURE: 75 MMHG | WEIGHT: 185 LBS | BODY MASS INDEX: 27.12 KG/M2 | OXYGEN SATURATION: 99 %

## 2022-10-25 DIAGNOSIS — J02.9 VIRAL PHARYNGITIS: Primary | ICD-10-CM

## 2022-10-25 DIAGNOSIS — J06.9 VIRAL UPPER RESPIRATORY INFECTION: ICD-10-CM

## 2022-10-25 LAB
DEPRECATED S PYO AG THROAT QL EIA: NEGATIVE
FLUAV AG SPEC QL IA: NEGATIVE
FLUBV AG SPEC QL IA: NEGATIVE
GROUP A STREP BY PCR: NOT DETECTED
SARS-COV-2 RNA RESP QL NAA+PROBE: NEGATIVE

## 2022-10-25 PROCEDURE — 87651 STREP A DNA AMP PROBE: CPT

## 2022-10-25 PROCEDURE — 87804 INFLUENZA ASSAY W/OPTIC: CPT

## 2022-10-25 PROCEDURE — U0003 INFECTIOUS AGENT DETECTION BY NUCLEIC ACID (DNA OR RNA); SEVERE ACUTE RESPIRATORY SYNDROME CORONAVIRUS 2 (SARS-COV-2) (CORONAVIRUS DISEASE [COVID-19]), AMPLIFIED PROBE TECHNIQUE, MAKING USE OF HIGH THROUGHPUT TECHNOLOGIES AS DESCRIBED BY CMS-2020-01-R: HCPCS

## 2022-10-25 PROCEDURE — U0005 INFEC AGEN DETEC AMPLI PROBE: HCPCS

## 2022-10-25 PROCEDURE — 99213 OFFICE O/P EST LOW 20 MIN: CPT | Mod: CS

## 2022-10-25 NOTE — PROGRESS NOTES
URGENT CARE  Assessment & Plan   Assessment:   Jorge Alberto Solorio is a 27 year old male who's clinical presentation today is consistent with:   1. Viral pharyngitis  - Streptococcus A Rapid Screen w/Reflex to PCR - Clinic Collect  - Group A Streptococcus PCR Throat Swab  - Symptomatic; Unknown COVID-19 Virus (Coronavirus) by PCR Nose  - Influenza A & B Antigen - Clinic Collect    No alarm signs or symptoms present   Differential Diagnoses for this patient's CC include    Bacterial vs viral etiology of URI    Covid, influenza, pharyngitis, pneumonia, bronchitis,    Common cold, allergic rhinitis, seasonal allergies    ABRS, viral sinusitis, cough, pertussis,   Mononucleosis, tonsillitis, chronic sinusitis, meningococcal disease    Plan:  Will treat patient with supportive and symptomatic measures for viral pharyngitis at this time which include: Fluids, rest, over-the-counter medications; , decongestants, antihistamines,  and expectorants, side effects of medications reviewed. Educated patient that honey, warm fluids and gargling saltwater can also help  additionally tested for bacterial cause and rapid test was negative for streptococcal A; PCR test pending (updated pt results will come via mychart/call and rx will be reflexed if positive), additionally, educated patient to monitor their symptoms for improvement over the next week and to return if the sore throat and/or tonsil swelling does not improve. discussed the possibility of mono, refused testing today. Will test for influenza and covid and follow up with pt with results as soon as they become available   Patient  is  agreeable to treatment plan and state they will follow-up if symptoms do not improve and/or if symptoms worsen  Medications ordered are listed below, please see AVS for patient's specific and personalized discharge instructions given     SAMUEL Gong [facility]  "BRANCH      ______________________________________________________________________        Subjective  Subjective     HPI: Jorge Alberto Solorio is a 27 year old  male who presents today for evaluation of the above noted concern(s).   Patient endorses a sore throat today which started 1 day ago on 10/24/22   Patient reports he recently had covid and took paxlovid about two weeks ago, and now he feels \"sick\" again with similar symptoms.   Patient endorses his most bothersome symptom is his sore throat and his body aches.    Patient denies any sweats, chills, wheezing, shortness of breath, difficulty breathing, chest pain, weakness or signs of dehydration   additionally patient denies a history of asthma or any other past medical history of breathing conditions    No Known Allergies  There is no problem list on file for this patient.      Review of Systems:  Pertinent review of systems as reflected in HPI, otherwise negative.     Objective  Objective    Physical Exam:  Vitals:    10/25/22 1735   BP: 123/75   Pulse: 75   Temp: 98.6  F (37  C)   TempSrc: Tympanic   SpO2: 99%   Weight: 83.9 kg (185 lb)      General: Alert and oriented, no acute distress, Vital signs reviewed: afebrile,  normotensive   Psy/mental status: Cooperative, nonanxious  SKIN: Intact, no rashes  EYES: EOMs intact, PERRLA bilaterally   Conjunctiva: Clear bilaterally, no injection or erythema present  EARS: TMs intact, translucent gray in color with normal landmarks present no erythema  or bulging tympanic membrane   Canals are without swelling, however have a mild amount of cerumen, no impaction  NOSE:  mucosa erythematous bilaterally with a mild amount of rhinorrhea, clear  discharge               No frontal or maxillary sinus tenderness present bilaterally  MOUTH/THROAT: lips, tongue, & oral mucosa appear normal upon inspection                Posterior oropharynx is erythematous and edematous but without exudate or lesions   edema  NECK: supple, has " full range of motion with no meningeal signs              No lymphadenopathy present  LUNG: normal work of breathing, good respiratory effort without retractions, good air  movement, non labored, inspection reveals normal chest expansion w/  inspiration            Lung sounds are clear to auscultation bilaterally,            No rales/rhonic/crackles wheezing noted           No cough noted       LABS:   Results for orders placed or performed in visit on 10/25/22   Streptococcus A Rapid Screen w/Reflex to PCR - Clinic Collect     Status: Normal    Specimen: Throat; Swab   Result Value Ref Range    Group A Strep antigen Negative Negative         I explained my diagnostic considerations and recommendations to the patient, who voiced understanding and agreement with the treatment plan.   All questions were answered.   We discussed potential side effects, risks and benefits of any prescribed or recommended therapies, as well as expectations for response to treatments.  Please see AVS for any patient instructions & handouts given.   Patient was advised to contact the Nurse Care Line, their Primary Care provider, Urgent Care, or the Emergency Department if there are new or worsening symptoms, or call 911 for emergencies.        ______________________________________________________________________          Patient Instructions   Diagnosis:  viral pharyngitis, viral Upper respiratory infection   Today we did:  Throat swab - negative for strep- rapid   PcR pending - will result to mychart tomorrow    Influenza and covid - pending - mychart     Plan:       NEGATIVE  NEGATIVE STREP TEST - VIRAL Pharyngitis aka  viral sore throat   1. Strep test was negative.  2. Monitor for improvement vs worsening  o Possibly need throat culture vs other testing (mono)     Viruses causing pharyngitis usually lasts anywhere from 10-14 days     Treatment To DOs:   Fluids: you need to drink lots of fluids: water, electrolytes, broth    Rest: you  need lots and lots of rest to get better, you have permission to sleep all day and stay home from work or school until you feel better   Fever/ body aches: ibuprofen (>6 months ) or Tylenol (> 3 month)     Symptoms: - what is your most bothersome symptom?     sore throat:   gargle saltwater, honey, warm fluids          cough drops or lozenges:    Cepacol Lozenge / Benzocaine     Children >5yrs old : one lozenge every 2 hours   Herbal:    - honey = good for cough suppressant    - zinc = 2-3mg lozenge Q2hrs    - menthol vapor rup on chest before sleep   Nasal congestion:           Decongestants:                > Pseudoephedrine (Sudafed) 60mg every 4 hours (not before bedtime)      Children >4yrs old: 15mg every 4hours chew (1mg/kg every 6hrs)       Liquid: Children's Silfedrine: 15 mg/5 mL      children >2 years old Phenylephrine (sudafed PE child)             Antihistamines:    > chlorpheniramine 4mg Q4hrs -or- clemastine 1mg twice a day (no more than 7 days)      Children >2yrs old: Claritin or zyrtec      >> add ibuprofen or tylenol for added effect   cough:          antitussives :: suppresses cough by topical anesthetic action on the respiratory stretch receptor    tessalon perles / Benzonatate - need prescription      >only in children >10yrs of age          Expectorants  ::increase respiratory tract clearance of mucus by adding hydration/viscosity causing thinning and loosening   Guaifenesin AND Dextromethorphan :: [adds cough Suppressant] inhibits cough reflex by decreasing cough receptors (relieves the irritating  dry cough)   Robitussin DM / Mucinex DM   Guaifenesin 200 to 400 mg // dextromethorphan 10 to 20 mg every 4 hours,   Combo tabs: 1-2 tabs every 12hrs         Children 4yr to <6 years: Dextromethorphan 2.5 to 5 mg with Guaifenesin 50 to 100 mg every  4 hours as needed   }    Monitor for:     Fever of 101 F or higher that is not resolving w/ tylenol or ibuprofen     Difficulty breathing: shortness of  breath, wheezing, chest pain with breathing     Signs of dehydration: Feeling weak, dizzy or that you might faint    Difficult or painful swallowing that is preventing you from eating or drinking     A skin rash - little red bumps on  your skin   1. Trouble breathing or catching your breath  2. Drooling and problems swallowing  3. Wheezing  4. Unable to talk  5. Feeling dizzy or faint  6. Feeling of doom

## 2022-10-25 NOTE — PATIENT INSTRUCTIONS
Diagnosis:  viral pharyngitis, viral Upper respiratory infection   Today we did:  Throat swab - negative for strep- rapid   PcR pending - will result to mychart tomorrow    Influenza and covid - pending - mychart     Plan:       NEGATIVE  NEGATIVE STREP TEST - VIRAL Pharyngitis aka  viral sore throat   Strep test was negative.  Monitor for improvement vs worsening  Possibly need throat culture vs other testing (mono)     Viruses causing pharyngitis usually lasts anywhere from 10-14 days     Treatment To DOs:   Fluids: you need to drink lots of fluids: water, electrolytes, broth    Rest: you need lots and lots of rest to get better, you have permission to sleep all day and stay home from work or school until you feel better   Fever/ body aches: ibuprofen (>6 months ) or Tylenol (> 3 month)     Symptoms: - what is your most bothersome symptom?     sore throat:   gargle saltwater, honey, warm fluids          cough drops or lozenges:    Cepacol Lozenge / Benzocaine     Children >5yrs old : one lozenge every 2 hours   Herbal:    - honey = good for cough suppressant    - zinc = 2-3mg lozenge Q2hrs    - menthol vapor rup on chest before sleep   Nasal congestion:           Decongestants:                > Pseudoephedrine (Sudafed) 60mg every 4 hours (not before bedtime)      Children >4yrs old: 15mg every 4hours chew (1mg/kg every 6hrs)       Liquid: Children's Silfedrine: 15 mg/5 mL      children >2 years old Phenylephrine (sudafed PE child)             Antihistamines:    > chlorpheniramine 4mg Q4hrs -or- clemastine 1mg twice a day (no more than 7 days)      Children >2yrs old: Claritin or zyrtec      >> add ibuprofen or tylenol for added effect   cough:          antitussives :: suppresses cough by topical anesthetic action on the respiratory stretch receptor    tessalon perles / Benzonatate - need prescription      >only in children >10yrs of age          Expectorants  ::increase respiratory tract clearance of mucus by  adding hydration/viscosity causing thinning and loosening   Guaifenesin AND Dextromethorphan :: [adds cough Suppressant] inhibits cough reflex by decreasing cough receptors (relieves the irritating  dry cough)   Robitussin DM / Mucinex DM   Guaifenesin 200 to 400 mg // dextromethorphan 10 to 20 mg every 4 hours,   Combo tabs: 1-2 tabs every 12hrs         Children 4yr to <6 years: Dextromethorphan 2.5 to 5 mg with Guaifenesin 50 to 100 mg every  4 hours as needed   }    Monitor for:   Fever of 101 F or higher that is not resolving w/ tylenol or ibuprofen   Difficulty breathing: shortness of breath, wheezing, chest pain with breathing   Signs of dehydration: Feeling weak, dizzy or that you might faint  Difficult or painful swallowing that is preventing you from eating or drinking   A skin rash - little red bumps on  your skin   Trouble breathing or catching your breath  Drooling and problems swallowing  Wheezing  Unable to talk  Feeling dizzy or faint  Feeling of doom

## 2023-02-04 ENCOUNTER — HEALTH MAINTENANCE LETTER (OUTPATIENT)
Age: 28
End: 2023-02-04

## 2023-03-16 ASSESSMENT — ENCOUNTER SYMPTOMS
PALPITATIONS: 0
CHILLS: 0
PARESTHESIAS: 0
DYSURIA: 0
EYE PAIN: 0
ABDOMINAL PAIN: 0
HEARTBURN: 0
HEADACHES: 0
DIZZINESS: 0
JOINT SWELLING: 0
DIARRHEA: 0
FEVER: 0
COUGH: 0
HEMATURIA: 0
ARTHRALGIAS: 0
NERVOUS/ANXIOUS: 0
CONSTIPATION: 0
SHORTNESS OF BREATH: 0
WEAKNESS: 0
HEMATOCHEZIA: 0
FREQUENCY: 0
NAUSEA: 0
SORE THROAT: 0
MYALGIAS: 0

## 2023-03-17 ENCOUNTER — OFFICE VISIT (OUTPATIENT)
Dept: FAMILY MEDICINE | Facility: CLINIC | Age: 28
End: 2023-03-17
Payer: COMMERCIAL

## 2023-03-17 VITALS
DIASTOLIC BLOOD PRESSURE: 70 MMHG | SYSTOLIC BLOOD PRESSURE: 110 MMHG | RESPIRATION RATE: 18 BRPM | HEIGHT: 70 IN | TEMPERATURE: 97.1 F | OXYGEN SATURATION: 98 % | WEIGHT: 188 LBS | HEART RATE: 68 BPM | BODY MASS INDEX: 26.92 KG/M2

## 2023-03-17 DIAGNOSIS — Z00.00 ROUTINE GENERAL MEDICAL EXAMINATION AT A HEALTH CARE FACILITY: Primary | ICD-10-CM

## 2023-03-17 LAB
ALBUMIN SERPL BCG-MCNC: 4.8 G/DL (ref 3.5–5.2)
ALP SERPL-CCNC: 55 U/L (ref 40–129)
ALT SERPL W P-5'-P-CCNC: 21 U/L (ref 10–50)
ANION GAP SERPL CALCULATED.3IONS-SCNC: 10 MMOL/L (ref 7–15)
AST SERPL W P-5'-P-CCNC: 27 U/L (ref 10–50)
BASOPHILS # BLD AUTO: 0 10E3/UL (ref 0–0.2)
BASOPHILS NFR BLD AUTO: 0 %
BILIRUB SERPL-MCNC: 0.4 MG/DL
BUN SERPL-MCNC: 15.9 MG/DL (ref 6–20)
CALCIUM SERPL-MCNC: 10 MG/DL (ref 8.6–10)
CHLORIDE SERPL-SCNC: 102 MMOL/L (ref 98–107)
CHOLEST SERPL-MCNC: 208 MG/DL
CREAT SERPL-MCNC: 1.14 MG/DL (ref 0.67–1.17)
DEPRECATED HCO3 PLAS-SCNC: 28 MMOL/L (ref 22–29)
EOSINOPHIL # BLD AUTO: 0.1 10E3/UL (ref 0–0.7)
EOSINOPHIL NFR BLD AUTO: 1 %
ERYTHROCYTE [DISTWIDTH] IN BLOOD BY AUTOMATED COUNT: 11.9 % (ref 10–15)
GFR SERPL CREATININE-BSD FRML MDRD: 90 ML/MIN/1.73M2
GLUCOSE SERPL-MCNC: 109 MG/DL (ref 70–99)
HCT VFR BLD AUTO: 46.1 % (ref 40–53)
HDLC SERPL-MCNC: 67 MG/DL
HGB BLD-MCNC: 16.1 G/DL (ref 13.3–17.7)
IMM GRANULOCYTES # BLD: 0 10E3/UL
IMM GRANULOCYTES NFR BLD: 0 %
LDLC SERPL CALC-MCNC: 129 MG/DL
LYMPHOCYTES # BLD AUTO: 3.1 10E3/UL (ref 0.8–5.3)
LYMPHOCYTES NFR BLD AUTO: 48 %
MCH RBC QN AUTO: 30.8 PG (ref 26.5–33)
MCHC RBC AUTO-ENTMCNC: 34.9 G/DL (ref 31.5–36.5)
MCV RBC AUTO: 88 FL (ref 78–100)
MONOCYTES # BLD AUTO: 0.7 10E3/UL (ref 0–1.3)
MONOCYTES NFR BLD AUTO: 10 %
NEUTROPHILS # BLD AUTO: 2.6 10E3/UL (ref 1.6–8.3)
NEUTROPHILS NFR BLD AUTO: 41 %
NONHDLC SERPL-MCNC: 141 MG/DL
NRBC # BLD AUTO: 0 10E3/UL
NRBC BLD AUTO-RTO: 0 /100
PLATELET # BLD AUTO: 250 10E3/UL (ref 150–450)
POTASSIUM SERPL-SCNC: 4.4 MMOL/L (ref 3.4–5.3)
PROT SERPL-MCNC: 7.9 G/DL (ref 6.4–8.3)
RBC # BLD AUTO: 5.22 10E6/UL (ref 4.4–5.9)
SODIUM SERPL-SCNC: 140 MMOL/L (ref 136–145)
TRIGL SERPL-MCNC: 61 MG/DL
WBC # BLD AUTO: 6.5 10E3/UL (ref 4–11)

## 2023-03-17 PROCEDURE — 36415 COLL VENOUS BLD VENIPUNCTURE: CPT | Performed by: FAMILY MEDICINE

## 2023-03-17 PROCEDURE — 80053 COMPREHEN METABOLIC PANEL: CPT | Performed by: FAMILY MEDICINE

## 2023-03-17 PROCEDURE — 80061 LIPID PANEL: CPT | Performed by: FAMILY MEDICINE

## 2023-03-17 PROCEDURE — 99395 PREV VISIT EST AGE 18-39: CPT | Performed by: FAMILY MEDICINE

## 2023-03-17 PROCEDURE — 85025 COMPLETE CBC W/AUTO DIFF WBC: CPT | Performed by: FAMILY MEDICINE

## 2023-03-17 ASSESSMENT — ENCOUNTER SYMPTOMS
FEVER: 0
HEARTBURN: 0
DIARRHEA: 0
SHORTNESS OF BREATH: 0
EYE PAIN: 0
NERVOUS/ANXIOUS: 0
ARTHRALGIAS: 0
HEMATOCHEZIA: 0
PALPITATIONS: 0
CHILLS: 0
COUGH: 0
JOINT SWELLING: 0
HEADACHES: 0
FREQUENCY: 0
WEAKNESS: 0
NAUSEA: 0
CONSTIPATION: 0
PARESTHESIAS: 0
ABDOMINAL PAIN: 0
DYSURIA: 0
SORE THROAT: 0
DIZZINESS: 0
MYALGIAS: 0
HEMATURIA: 0

## 2023-03-17 NOTE — PROGRESS NOTES
SUBJECTIVE:   CC: Jorge Alberto is an 27 year old who presents for preventative health visit.     Patient has been advised of split billing requirements and indicates understanding: Yes  Healthy Habits:     Getting at least 3 servings of Calcium per day:  Yes    Bi-annual eye exam:  Yes    Dental care twice a year:  Yes    Sleep apnea or symptoms of sleep apnea:  None    Diet:  Regular (no restrictions)    Frequency of exercise:  4-5 days/week    Duration of exercise:  30-45 minutes    Taking medications regularly:  Yes    PHQ-2 Total Score: 0    Additional concerns today:  No     27 year old male who presents to clinic for annual exam.     No concerns at this time.   Wishes to have some baseline labs.       Today's PHQ-2 Score:   PHQ-2 ( 1999 [employer]) 3/16/2023   Q1: Little interest or pleasure in doing things 0   Q2: Feeling down, depressed or hopeless 0   PHQ-2 Score 0   PHQ-2 Total Score (12-17 Years)- Positive if 3 or more points; Administer PHQ-A if positive -   Q1: Little interest or pleasure in doing things Not at all   Q2: Feeling down, depressed or hopeless Not at all   PHQ-2 Score 0         Social History     Tobacco Use     Smoking status: Never     Smokeless tobacco: Never   Substance Use Topics     Alcohol use: Yes     Comment: occ         Alcohol Use 3/16/2023   Prescreen: >3 drinks/day or >7 drinks/week? No       Last PSA: No results found for: PSA    Reviewed orders with patient. Reviewed health maintenance and updated orders accordingly - Yes      Reviewed and updated as needed this visit by clinical staff   Tobacco  Allergies  Meds  Problems  Med Hx  Surg Hx  Fam Hx          Reviewed and updated as needed this visit by Provider                   Review of Systems   Constitutional: Negative for chills and fever.   HENT: Negative for congestion, ear pain, hearing loss and sore throat.    Eyes: Negative for pain and visual disturbance.   Respiratory: Negative for cough and shortness of breath.   "  Cardiovascular: Negative for chest pain, palpitations and peripheral edema.   Gastrointestinal: Negative for abdominal pain, constipation, diarrhea, heartburn, hematochezia and nausea.   Genitourinary: Negative for dysuria, frequency, genital sores, hematuria, impotence, penile discharge and urgency.   Musculoskeletal: Negative for arthralgias, joint swelling and myalgias.   Skin: Negative for rash.   Neurological: Negative for dizziness, weakness, headaches and paresthesias.   Psychiatric/Behavioral: Negative for mood changes. The patient is not nervous/anxious.          OBJECTIVE:   /70 (BP Location: Left arm, Patient Position: Chair, Cuff Size: Adult Regular)   Pulse 68   Temp 97.1  F (36.2  C) (Tympanic)   Resp 18   Ht 1.772 m (5' 9.75\")   Wt 85.3 kg (188 lb)   SpO2 98%   BMI 27.17 kg/m      Physical Exam  GENERAL: healthy, alert and no distress  EYES: Eyes grossly normal to inspection, PERRL and conjunctivae and sclerae normal  HENT: ear canals and TM's normal, nose and mouth without ulcers or lesions  NECK: no adenopathy, no asymmetry, masses, or scars and thyroid normal to palpation  RESP: lungs clear to auscultation - no rales, rhonchi or wheezes  CV: regular rate and rhythm, normal S1 S2, no S3 or S4, no murmur, click or rub, no peripheral edema and peripheral pulses strong  ABDOMEN: soft, nontender, no hepatosplenomegaly, no masses and bowel sounds normal  MS: no gross musculoskeletal defects noted, no edema  SKIN: no suspicious lesions or rashes  NEURO: Normal strength and tone, mentation intact and speech normal  PSYCH: mentation appears normal, affect normal/bright      ASSESSMENT/PLAN:   Jorge Alberto was seen today for physical.    Diagnoses and all orders for this visit:    Routine general medical examination at a health care facility  -- doing well. Continue current cares.   -     Comprehensive metabolic panel (BMP + Alb, Alk Phos, ALT, AST, Total. Bili, TP)  -     CBC with platelets and " differential  -     Lipid panel reflex to direct LDL Fasting      Patient has been advised of split billing requirements and indicates understanding: Yes      COUNSELING:   Reviewed preventive health counseling, as reflected in patient instructions       Regular exercise       Healthy diet/nutrition       Vision screening        He reports that he has never smoked. He has never used smokeless tobacco.            Taz Holm, DO  [facility]

## 2023-06-19 ENCOUNTER — VIRTUAL VISIT (OUTPATIENT)
Dept: FAMILY MEDICINE | Facility: CLINIC | Age: 28
End: 2023-06-19
Payer: COMMERCIAL

## 2023-06-19 DIAGNOSIS — F41.1 GAD (GENERALIZED ANXIETY DISORDER): Primary | ICD-10-CM

## 2023-06-19 PROCEDURE — 99214 OFFICE O/P EST MOD 30 MIN: CPT | Mod: VID

## 2023-06-19 RX ORDER — BUSPIRONE HYDROCHLORIDE 5 MG/1
5-10 TABLET ORAL 2 TIMES DAILY
Qty: 60 TABLET | Refills: 2 | Status: SHIPPED | OUTPATIENT
Start: 2023-06-19 | End: 2023-07-28

## 2023-06-19 ASSESSMENT — PATIENT HEALTH QUESTIONNAIRE - PHQ9: SUM OF ALL RESPONSES TO PHQ QUESTIONS 1-9: 4

## 2023-06-19 ASSESSMENT — ANXIETY QUESTIONNAIRES: GAD7 TOTAL SCORE: 7

## 2023-06-19 ASSESSMENT — ENCOUNTER SYMPTOMS: NERVOUS/ANXIOUS: 1

## 2023-06-19 NOTE — PROGRESS NOTES
Jorge Alberto is a 28 year old who is being evaluated via a billable video visit.      How would you like to obtain your AVS? MyChart  If the video visit is dropped, the invitation should be resent by: Text to cell phone: [phone]  Will anyone else be joining your video visit? No      Assessment & Plan     EMMANUELLE (generalized anxiety disorder)  EMMANUELLE score of 7 bilaterally.  Patient did well with counseling in the past and has several self soothing and calming techniques that he is trying are not helping.  We will refer him back to counseling for this with his generalized anxiety, will also start him on BuSpar.  We will start him on 1 tablet twice a day, after a week if he is not having any benefit he can go to 2 tablets twice a day.  Follow-up with me in 6 weeks.  Chest tightness does not happen at any other time besides when he is anxious, but if chest tightness increases in duration, frequency, or severity he should be seen urgently.  He verbalized understanding.  No episodes of panic.  He is still able to complete his ADLs and go to work.  We will start with buspirone and mental health referral and follow-up closely.  - Adult Mental Health  Referral; Future  - busPIRone (BUSPAR) 5 MG tablet; Take 1-2 tablets (5-10 mg) by mouth 2 times daily         6/17/2023    10:21 PM   EMMANUELLE-7 SCORE   Total Score 7 (mild anxiety)   Total Score 7           6/19/2023    12:42 PM   PHQ   PHQ-9 Total Score 4   Q9: Thoughts of better off dead/self-harm past 2 weeks Not at all     Subjective   Jorge Alberto is a 28 year old, presenting for the following health issues:  Anxiety (Has seen a therapist at an outside location. Ongoing issue, discuss starting medication )        6/19/2023    12:41 PM   Additional Questions   Roomed by Janene ELLINGTON     Anxiety  Associated symptoms include fatigue, myalgias and neck pain. Pertinent negatives include no chest pain.   History of Present Illness       Mental Health Follow-up:  Patient presents to  follow-up on Anxiety.    Patient's anxiety since last visit has been:  Worse  The patient is having other symptoms associated with anxiety.  Any significant life events: grief or loss  Patient is feeling anxious or having panic attacks.  Patient has concerns about alcohol or drug use.    He eats 2-3 servings of fruits and vegetables daily.He consumes 1 sweetened beverage(s) daily.He exercises with enough effort to increase his heart rate 30 to 60 minutes per day.  He exercises with enough effort to increase his heart rate 5 days per week.   He is taking medications regularly.  Today's EMMANUELLE-7 Score: 7     Patient is having anxiety. This has been doing on since before [facility]. He was just visiting with counselors previously and he did not need medications. At this time he feels things are worsening and seeing a counselor as well as medications would likely be helpful.     He gets a lot of physical symptoms with his anxiety. Body aches, pressure in his chest (not consistently). Muscle tension in his neck and shoulders. He has a lot of strategies that have been helpful, but at some point it doesn't help. This is happening more frequently.   When he is busy at work and home, this can be a weekly thing, or it can be more frequently when he is less busy.    He is an exercise physiologist.     Review of Systems   Constitutional: Positive for fatigue.   Respiratory: Positive for chest tightness. Negative for shortness of breath.    Cardiovascular: Negative for chest pain.   Musculoskeletal: Positive for myalgias and neck pain.   Psychiatric/Behavioral: Negative for behavioral problems, decreased concentration, mood changes, self-injury, sleep disturbance and suicidal ideas. The patient is nervous/anxious.           Objective       Vitals:  No vitals were obtained today due to virtual visit.    Physical Exam   GENERAL: Healthy, alert and no distress  EYES: Eyes grossly normal to inspection.  No discharge or erythema, or obvious  scleral/conjunctival abnormalities.  RESP: No audible wheeze, cough, or visible cyanosis.  No visible retractions or increased work of breathing.    SKIN: Visible skin clear. No significant rash, abnormal pigmentation or lesions.  NEURO: Cranial nerves grossly intact.  Mentation and speech appropriate for age.  PSYCH: Mentation appears normal, affect normal/bright, judgement and insight intact, normal speech and appearance well-groomed.    Video-Visit Details    Type of service:  Video Visit     Originating Location (pt. Location): Other Car    Distant Location (provider location):  On-site  Platform used for Video Visit: [employer]

## 2023-06-28 ASSESSMENT — ENCOUNTER SYMPTOMS
NECK PAIN: 1
MYALGIAS: 1
CHEST TIGHTNESS: 1
FATIGUE: 1
SLEEP DISTURBANCE: 0
SHORTNESS OF BREATH: 0
DECREASED CONCENTRATION: 0

## 2023-06-30 ENCOUNTER — TRANSFERRED RECORDS (OUTPATIENT)
Dept: HEALTH INFORMATION MANAGEMENT | Facility: CLINIC | Age: 28
End: 2023-06-30

## 2023-07-28 ENCOUNTER — VIRTUAL VISIT (OUTPATIENT)
Dept: FAMILY MEDICINE | Facility: CLINIC | Age: 28
End: 2023-07-28
Payer: COMMERCIAL

## 2023-07-28 DIAGNOSIS — F41.1 GAD (GENERALIZED ANXIETY DISORDER): ICD-10-CM

## 2023-07-28 PROCEDURE — 99213 OFFICE O/P EST LOW 20 MIN: CPT | Mod: VID

## 2023-07-28 RX ORDER — BUSPIRONE HYDROCHLORIDE 5 MG/1
10 TABLET ORAL 2 TIMES DAILY
Qty: 360 TABLET | Refills: 1 | Status: SHIPPED | OUTPATIENT
Start: 2023-07-28 | End: 2023-10-26

## 2023-07-28 ASSESSMENT — ANXIETY QUESTIONNAIRES
5. BEING SO RESTLESS THAT IT IS HARD TO SIT STILL: NOT AT ALL
GAD7 TOTAL SCORE: 7
6. BECOMING EASILY ANNOYED OR IRRITABLE: SEVERAL DAYS
7. FEELING AFRAID AS IF SOMETHING AWFUL MIGHT HAPPEN: SEVERAL DAYS
3. WORRYING TOO MUCH ABOUT DIFFERENT THINGS: SEVERAL DAYS
2. NOT BEING ABLE TO STOP OR CONTROL WORRYING: MORE THAN HALF THE DAYS
1. FEELING NERVOUS, ANXIOUS, OR ON EDGE: SEVERAL DAYS
4. TROUBLE RELAXING: SEVERAL DAYS

## 2023-07-28 ASSESSMENT — ENCOUNTER SYMPTOMS
DYSPHORIC MOOD: 0
FATIGUE: 0
NERVOUS/ANXIOUS: 1
AGITATION: 0
DIZZINESS: 1
HEADACHES: 0

## 2023-07-28 NOTE — PROGRESS NOTES
"Jorge Alberto is a 28 year old who is being evaluated via a billable video visit.      How would you like to obtain your AVS? MyChart  If the video visit is dropped, the invitation should be resent by: Text to cell phone: [phone]  Will anyone else be joining your video visit? No      EMMANUELLE (generalized anxiety disorder)  EMMANUELLE score today is still at 7, but patient reports benefit from medication.  He feels he is doing much better.  He tells me he felt relief of symptoms almost immediately.  He has gone up to the 10 mg as appropriately recommended.  At this time he feels \"more empowered.\"  He is happy with where things are.  We will not adjust dose at this time.  No follow-up set for this time as he is doing better.  Recommend continuing to follow with a counselor.  Encouraged him to reach out at any time if things get worse, are not improving, or feel like he wants to try and come off or adjust meds.  He verbalized understanding.  We discussed safe titration off of the medication in the future if he desires.  He had a question about drinking with the medication, I reported that there could be increased auditory effects, and to if he drinks after taking a dose he should make sure he is with friends and if he is not responsive they should call 911.  - busPIRone (BUSPAR) 5 MG tablet; Take 2 tablets (10 mg) by mouth 2 times daily for 180 days     Nikko Azul is a 28 year old, presenting for the following health issues:     Follow-up for anxiety.  We started him on BuSpar.  He was taking the 5 mg twice a day for 3 weeks and then increase to 10 mg twice a day.  He felt relief almost instantly from taking.  He did have a little bit of dizziness and headaches when he first started, but this has improved.  He is feeling \"more empowered.\"  He feels happy with where things are.  He can tell a difference.    No thoughts of suicide or self-harm.  He did start seeing a counselor outside of the [facility] system.  " He is not with him twice and liked it.    Depression not an issue.   Recheck Medication (Medication that was started 1 month prior. )      7/28/2023     9:44 AM   Additional Questions   Roomed by jacquie     History of Present Illness       Reason for visit:  Follow up for anxiety and starting buspar medication about a month ago    He eats 4 or more servings of fruits and vegetables daily.He consumes 1 sweetened beverage(s) daily.He exercises with enough effort to increase his heart rate 60 or more minutes per day.  He exercises with enough effort to increase his heart rate 5 days per week. He is missing 1 dose(s) of medications per week.  He is not taking prescribed medications regularly due to remembering to take.       Review of Systems   Constitutional:  Negative for fatigue.   Neurological:  Positive for dizziness (improving). Negative for headaches.   Psychiatric/Behavioral:  Negative for agitation, behavioral problems, dysphoric mood, mood changes, self-injury and suicidal ideas. The patient is nervous/anxious.           Objective         Vitals:  No vitals were obtained today due to virtual visit.    Physical Exam   GENERAL: Healthy, alert and no distress  EYES: Eyes grossly normal to inspection.  No discharge or erythema, or obvious scleral/conjunctival abnormalities.  RESP: No audible wheeze, cough, or visible cyanosis.  No visible retractions or increased work of breathing.    SKIN: Visible skin clear. No significant rash, abnormal pigmentation or lesions.  NEURO: Cranial nerves grossly intact.  Mentation and speech appropriate for age.  PSYCH: Mentation appears normal, affect normal/bright, judgement and insight intact, normal speech and appearance well-groomed.          Video-Visit Details    Type of service:  Video Visit     Originating Location (pt. Location): Home    Distant Location (provider location):  On-site  Platform used for Video Visit: EPV SOLAR    At the end of the visit, I confirmed  understanding of what was discussed. Patient has no further questions or concerns that were brought up at this time.     Ramakrishna Herron, JELLY, APRN, FNP-C

## 2023-10-22 ASSESSMENT — ANXIETY QUESTIONNAIRES
GAD7 TOTAL SCORE: 17
7. FEELING AFRAID AS IF SOMETHING AWFUL MIGHT HAPPEN: NEARLY EVERY DAY
6. BECOMING EASILY ANNOYED OR IRRITABLE: MORE THAN HALF THE DAYS
2. NOT BEING ABLE TO STOP OR CONTROL WORRYING: NEARLY EVERY DAY
5. BEING SO RESTLESS THAT IT IS HARD TO SIT STILL: SEVERAL DAYS
4. TROUBLE RELAXING: MORE THAN HALF THE DAYS
GAD7 TOTAL SCORE: 17
IF YOU CHECKED OFF ANY PROBLEMS ON THIS QUESTIONNAIRE, HOW DIFFICULT HAVE THESE PROBLEMS MADE IT FOR YOU TO DO YOUR WORK, TAKE CARE OF THINGS AT HOME, OR GET ALONG WITH OTHER PEOPLE: NOT DIFFICULT AT ALL
3. WORRYING TOO MUCH ABOUT DIFFERENT THINGS: NEARLY EVERY DAY
1. FEELING NERVOUS, ANXIOUS, OR ON EDGE: NEARLY EVERY DAY

## 2023-10-26 ENCOUNTER — OFFICE VISIT (OUTPATIENT)
Dept: FAMILY MEDICINE | Facility: CLINIC | Age: 28
End: 2023-10-26
Payer: COMMERCIAL

## 2023-10-26 VITALS
BODY MASS INDEX: 28.35 KG/M2 | HEART RATE: 82 BPM | DIASTOLIC BLOOD PRESSURE: 70 MMHG | RESPIRATION RATE: 16 BRPM | SYSTOLIC BLOOD PRESSURE: 128 MMHG | TEMPERATURE: 97.8 F | HEIGHT: 70 IN | OXYGEN SATURATION: 97 % | WEIGHT: 198 LBS

## 2023-10-26 DIAGNOSIS — F41.1 GAD (GENERALIZED ANXIETY DISORDER): ICD-10-CM

## 2023-10-26 PROCEDURE — 99213 OFFICE O/P EST LOW 20 MIN: CPT

## 2023-10-26 RX ORDER — BUSPIRONE HYDROCHLORIDE 30 MG/1
30 TABLET ORAL 2 TIMES DAILY
Qty: 60 TABLET | Refills: 2 | Status: SHIPPED | OUTPATIENT
Start: 2023-10-26 | End: 2023-11-30

## 2023-10-26 RX ORDER — GABAPENTIN 300 MG/1
300 CAPSULE ORAL DAILY
Qty: 30 CAPSULE | Refills: 2 | Status: SHIPPED | OUTPATIENT
Start: 2023-10-26 | End: 2023-11-30

## 2023-10-26 ASSESSMENT — PAIN SCALES - GENERAL: PAINLEVEL: NO PAIN (0)

## 2023-10-26 NOTE — PROGRESS NOTES
"  Assessment & Plan     EMMANUELLE (generalized anxiety disorder)  We will increase the Buspar to 30 mg BID as he had benefit initially with this, close follow-up in 6 weeks. We will also add in gabapentin for further benefit. Monitor for any new side effects when starting such as sedation. If no benefit in 6 weeks would switch to selective serotonin reuptake inhibitor.   - busPIRone (BUSPAR) 30 MG tablet; Take 1 tablet (30 mg) by mouth 2 times daily for 90 days  - gabapentin (NEURONTIN) 300 MG capsule; Take 1 capsule (300 mg) by mouth daily for 90 days         6/17/2023    10:21 PM 10/22/2023     3:03 PM   EMMANUELLE-7 SCORE   Total Score 7 (mild anxiety) 17 (severe anxiety)   Total Score 7 17         6/19/2023    12:42 PM   PHQ   PHQ-9 Total Score 4   Q9: Thoughts of better off dead/self-harm past 2 weeks Not at all         BMI:   Estimated body mass index is 28.41 kg/m  as calculated from the following:    Height as of this encounter: 1.778 m (5' 10\").    Weight as of this encounter: 89.8 kg (198 lb).   Weight management plan: Patient was referred to their PCP to discuss a diet and exercise plan.      Nikko Azul is a 28 year old, presenting for the following health issues:  Recheck Medication      10/26/2023     3:39 PM   Additional Questions   Roomed by jacquie     Has noticed a regression in benefits from Buspar, but things have been more stressful. Felt more level-headed when he started it but now having bigger swings in mood. He does feel depression has improved some. Has been in therapy for a few months. Has been working on strategies and is trying to sit with the anxiety a little more and this can spiral into depression. Anxiety feels worse then the depression. No panic attacks. No heart palpitations or trouble breathing. Wondering if there is anything that we can do to help with the anxiety further.     History of Present Illness       Mental Health Follow-up:  Patient presents to follow-up on Depression & " "Anxiety.Patient's depression since last visit has been:  Medium  The patient is having other symptoms associated with depression.  Patient's anxiety since last visit has been:  Worse  The patient is having other symptoms associated with anxiety.  Any significant life events: grief or loss and other  Patient is feeling anxious or having panic attacks.  Patient has concerns about alcohol or drug use.    He eats 2-3 servings of fruits and vegetables daily.He consumes 0 sweetened beverage(s) daily.He exercises with enough effort to increase his heart rate 30 to 60 minutes per day.  He exercises with enough effort to increase his heart rate 5 days per week. He is missing 1 dose(s) of medications per week.  He is not taking prescribed medications regularly due to remembering to take.     Review of Systems   Respiratory:  Negative for shortness of breath.    Cardiovascular:  Negative for chest pain.   Psychiatric/Behavioral:  Positive for mood changes. Negative for behavioral problems, self-injury and suicidal ideas. The patient is nervous/anxious.             Objective    /70 (BP Location: Right arm, Patient Position: Sitting)   Pulse 82   Temp 97.8  F (36.6  C) (Oral)   Resp 16   Ht 1.778 m (5' 10\")   Wt 89.8 kg (198 lb)   SpO2 97%   BMI 28.41 kg/m    Body mass index is 28.41 kg/m .  Physical Exam  Constitutional:       General: He is not in acute distress.  Cardiovascular:      Rate and Rhythm: Normal rate and regular rhythm.      Heart sounds: Normal heart sounds.   Pulmonary:      Effort: No respiratory distress.   Neurological:      General: No focal deficit present.      Mental Status: He is alert. Mental status is at baseline.      Gait: Gait normal.   Psychiatric:         Mood and Affect: Mood normal.         Thought Content: Thought content normal.        At the end of the visit, I confirmed understanding of what was discussed. Patient has no further questions or concerns that were brought up at this " time.     Ramakrishna Herron, JELLY, APRN, FNP-C

## 2023-11-01 ENCOUNTER — OFFICE VISIT (OUTPATIENT)
Dept: URGENT CARE | Facility: URGENT CARE | Age: 28
End: 2023-11-01
Payer: COMMERCIAL

## 2023-11-01 VITALS
TEMPERATURE: 98.9 F | RESPIRATION RATE: 16 BRPM | BODY MASS INDEX: 28.27 KG/M2 | DIASTOLIC BLOOD PRESSURE: 63 MMHG | WEIGHT: 197 LBS | OXYGEN SATURATION: 95 % | SYSTOLIC BLOOD PRESSURE: 121 MMHG | HEART RATE: 95 BPM

## 2023-11-01 DIAGNOSIS — J40 BRONCHITIS: Primary | ICD-10-CM

## 2023-11-01 LAB
BASOPHILS # BLD AUTO: 0 10E3/UL (ref 0–0.2)
BASOPHILS NFR BLD AUTO: 0 %
EOSINOPHIL # BLD AUTO: 0 10E3/UL (ref 0–0.7)
EOSINOPHIL NFR BLD AUTO: 0 %
ERYTHROCYTE [DISTWIDTH] IN BLOOD BY AUTOMATED COUNT: 12.1 % (ref 10–15)
FLUAV AG SPEC QL IA: NEGATIVE
FLUBV AG SPEC QL IA: NEGATIVE
HCT VFR BLD AUTO: 42.7 % (ref 40–53)
HGB BLD-MCNC: 14.4 G/DL (ref 13.3–17.7)
IMM GRANULOCYTES # BLD: 0 10E3/UL
IMM GRANULOCYTES NFR BLD: 0 %
LYMPHOCYTES # BLD AUTO: 2.7 10E3/UL (ref 0.8–5.3)
LYMPHOCYTES NFR BLD AUTO: 22 %
MCH RBC QN AUTO: 30.4 PG (ref 26.5–33)
MCHC RBC AUTO-ENTMCNC: 33.7 G/DL (ref 31.5–36.5)
MCV RBC AUTO: 90 FL (ref 78–100)
MONOCYTES # BLD AUTO: 1.4 10E3/UL (ref 0–1.3)
MONOCYTES NFR BLD AUTO: 12 %
NEUTROPHILS # BLD AUTO: 8.3 10E3/UL (ref 1.6–8.3)
NEUTROPHILS NFR BLD AUTO: 66 %
PLATELET # BLD AUTO: 229 10E3/UL (ref 150–450)
RBC # BLD AUTO: 4.74 10E6/UL (ref 4.4–5.9)
SARS-COV-2 RNA RESP QL NAA+PROBE: NEGATIVE
WBC # BLD AUTO: 12.5 10E3/UL (ref 4–11)

## 2023-11-01 PROCEDURE — 85025 COMPLETE CBC W/AUTO DIFF WBC: CPT

## 2023-11-01 PROCEDURE — 99214 OFFICE O/P EST MOD 30 MIN: CPT

## 2023-11-01 PROCEDURE — 87804 INFLUENZA ASSAY W/OPTIC: CPT

## 2023-11-01 PROCEDURE — 87635 SARS-COV-2 COVID-19 AMP PRB: CPT

## 2023-11-01 PROCEDURE — 36415 COLL VENOUS BLD VENIPUNCTURE: CPT

## 2023-11-01 RX ORDER — AZITHROMYCIN 250 MG/1
TABLET, FILM COATED ORAL
Qty: 6 TABLET | Refills: 0 | Status: SHIPPED | OUTPATIENT
Start: 2023-11-01 | End: 2023-11-06

## 2023-11-01 NOTE — PATIENT INSTRUCTIONS
"Diagnosis:  given elevated wbc count possibly a bacterial cause of illness - bacterial bronchitis   Today we did:  Cbc - elevated wbc count   Start antibiotics    Plan:   Fluids: you need to drink lots of fluids: water, electrolytes, broth    Rest: you need lots and lots of rest to get better, you have permission to sleep all day and stay home from work or school until you feel better   Treat the most bothersome symptoms.... see symptoms below.....   Monitor for:   Fever: >101 F that is not relieved w/ tylenol, worsening fevers   Difficulty breathing: shortness of breath, wheezing, chest pain with breathing   Signs of dehydration: Feeling weak, dizzy or that you might faint  Chest pain   You have been fighting this illness for >14 days and are not getting better           Cold and Flu     Unfortunately, <2% of sinus/throat/cough symptoms are caused by a bacteria   However, You are SICK! This is often miserable! And I feel for you!   We cannot make it go away, but lets work to shorten the duration and severity!   Your most bothersome symptom is often where the virus settled in your body:    Nose, throat, or lungs, it may cause cough, sore throat, congestion, runny nose, headache, earache or shortness of breath.   It can also settle in your stomach and cause nausea, vomiting, and diarrhea.   Sometimes it causes generalized symptoms like \"aching all over,\" feeling tired, loss of energy, or loss of appetite.  ------- This illness usually lasts anywhere from 10-14 days ----------- hang in there.         We Treat URIs by helping relieve symptoms     Symptoms: - what is your most bothersome symptom?   Nasal congestion:           Decongestants:                > Pseudoephedrine (Sudafed) 60mg every 4 hours (not before bedtime)      Children >4yrs old: 15mg every 4hours chew (1mg/kg every 6hrs)       Liquid: Children's Silfedrine: 15 mg/5 mL      children >2 years old Phenylephrine (sudafed PE child)             " Antihistamines:    > chlorpheniramine 4mg Q4hrs -or- clemastine 1mg twice a day (no more than 7 days)      Children >2yrs old: Claritin or zyrtec      >> add ibuprofen or tylenol for added effect   cough:          antitussives :: suppresses cough by topical anesthetic action on the respiratory stretch receptor    tessalon perles / Benzonatate - need prescription      >only in children >10yrs of age          Expectorants  ::increase respiratory tract clearance of mucus by adding hydration/viscosity causing thinning and loosening   Guaifenesin AND Dextromethorphan :: [adds cough Suppressant] inhibits cough reflex by decreasing cough receptors (relieves the irritating  dry cough)   Robitussin DM / Mucinex DM   Guaifenesin 200 to 400 mg // dextromethorphan 10 to 20 mg every 4 hours,   Combo tabs: 1-2 tabs every 12hrs         Children 4yr to <6 years: Dextromethorphan 2.5 to 5 mg with Guaifenesin 50 to 100 mg every  4 hours as needed  sore throat:   gargle saltwater, honey, warm fluids          cough drops or lozenges:    Cepacol Lozenge / Benzocaine     Children >5yrs old : one lozenge every 2 hours   Herbal:    - honey = good for cough suppressant    - zinc = 2-3mg lozenge Q2hrs    - menthol vapor rup on chest before sleep

## 2023-11-01 NOTE — PROGRESS NOTES
URGENT CARE  Assessment & Plan   Assessment:   Jorge Alberto Solorio is a 28 year old male who's clinical presentation today is consistent with:   1. Bronchitis  - Symptomatic COVID-19 Virus (Coronavirus) by PCR Nose  - Influenza A & B Antigen - Clinic Collect  - CBC with platelets and differential;  - azithromycin (ZITHROMAX) 250 MG tablet;  Plan:  Will treat patient today for bronchitis supportively and symptomatically. Patient's labs showed an elevated wbc count and suspicious for bacterial infectious lung pathology. Will attempt to alleviate patient's symptoms today w/ antibiotics side effects of medications reviewed. Also encouraged patient to continue with OTC cold/flu medications and encouraged fluids and rest. Additionally we discussed if symptoms do not improve after starting today's treatment (or if symptoms worsen) to follow up in 5-7 days.    No alarm signs or symptoms present   Differential Diagnoses for this patient's chief complaint that I considered include:  Bacterial vs viral etiology of URI, Covid, influenza,} pharyngitis/tonsillitis, pneumonia, Common cold, allergic rhinitis, seasonal allergies, ABRS, viral sinusitis, cough, pertussis, Mononucleosis, tonsillitis, chronic sinusitis, meningococcal disease     Patient is agreeable to treatment plan and state they will follow-up if symptoms do not improve and/or if symptoms worsen   see patient's AVS 'monitor for' section for specific patient instructions given and discussed regarding what to watch for and when to follow up    SAMUEL Gong [facility] [place]      ______________________________________________________________________      Subjective     HPI: Jorge Alberto Solorio  is a 28 year old  male who presents today for evaluation the following concerns:   Patient  endorses flu like symptoms today which started a few days ago   Patient reports he had a cough for a month but that got better, and now has new fever, chill,  body aches and head and chest congestion  which just started a few days ago   Patient denies any shortness of breath, difficulty breathing, chest pain, weakness or signs of dehydration     Review of Systems:  Pertinent review of systems as reflected in HPI, otherwise negative.     Objective    Physical Exam:  Vitals:    11/01/23 1047   BP: 121/63   Pulse: 95   Resp: 16   Temp: 98.9  F (37.2  C)   TempSrc: Tympanic   SpO2: 95%   Weight: 89.4 kg (197 lb)      General: Alert and oriented, no acute distress, Vital signs reviewed: afebrile,  normotensive   Psy/mental status: Cooperative, nonanxious  SKIN: Intact, no rashes  EYES: EOMs intact, PERRLA bilaterally   Conjunctiva: Clear bilaterally, no injection or erythema present  EARS: TMs intact, translucent gray in color with normal landmarks present no erythema  or bulging tympanic membrane   Canals are without swelling, however have a mild amount of cerumen, no impaction  NOSE:  mucosa moist               No frontal or maxillary sinus tenderness present bilaterally  MOUTH/THROAT: lips, tongue, & oral mucosa appear normal upon inspection                Posterior oropharynx is erythematous but without exudate, lesions or tonsillar  Edema, no dysphonia, no unilateral tonsillar edema, no uvular deviation,   no signs of peritonsillar abscess  NECK: supple, has full range of motion with no meningeal signs              No lymphadenopathy present  LUNG: normal work of breathing, good respiratory effort without retractions, good air  movement, non labored, inspection reveals normal chest expansion w/  inspiration            Lung sounds are clear to auscultation bilaterally,            No rales/rhonic/crackles wheezing noted           No cough noted     LABS:   Results for orders placed or performed in visit on 11/01/23   CBC with platelets and differential     Status: Abnormal   Result Value Ref Range    WBC Count 12.5 (H) 4.0 - 11.0 10e3/uL    RBC Count 4.74 4.40 - 5.90  10e6/uL    Hemoglobin 14.4 13.3 - 17.7 g/dL    Hematocrit 42.7 40.0 - 53.0 %    MCV 90 78 - 100 fL    MCH 30.4 26.5 - 33.0 pg    MCHC 33.7 31.5 - 36.5 g/dL    RDW 12.1 10.0 - 15.0 %    Platelet Count 229 150 - 450 10e3/uL    % Neutrophils 66 %    % Lymphocytes 22 %    % Monocytes 12 %    % Eosinophils 0 %    % Basophils 0 %    % Immature Granulocytes 0 %    Absolute Neutrophils 8.3 1.6 - 8.3 10e3/uL    Absolute Lymphocytes 2.7 0.8 - 5.3 10e3/uL    Absolute Monocytes 1.4 (H) 0.0 - 1.3 10e3/uL    Absolute Eosinophils 0.0 0.0 - 0.7 10e3/uL    Absolute Basophils 0.0 0.0 - 0.2 10e3/uL    Absolute Immature Granulocytes 0.0 <=0.4 10e3/uL   Influenza A & B Antigen - Clinic Collect     Status: Normal    Specimen: Nose; Swab   Result Value Ref Range    Influenza A antigen Negative Negative    Influenza B antigen Negative Negative    Narrative    Test results must be correlated with clinical data. If necessary, results should be confirmed by a molecular assay or viral culture.   CBC with platelets and differential     Status: Abnormal    Narrative    The following orders were created for panel order CBC with platelets and differential.  Procedure                               Abnormality         Status                     ---------                               -----------         ------                     CBC with platelets and d...[[id number]]  Abnormal            Final result                 Please view results for these tests on the individual orders.      ______________________________________________________________________    I explained my diagnostic considerations and recommendations to the patient, who voiced understanding and agreement with the treatment plan.   All questions were answered.   We discussed potential side effects, risks and benefits of any prescribed or recommended therapies, as well as expectations for response to treatments.  Please see AVS for any patient instructions & handouts given.   Patient  was advised to contact the Nurse Care Line, their Primary Care provider, Urgent Care, or the Emergency Department if there are new or worsening symptoms, or call 911 for emergencies.

## 2023-11-02 ENCOUNTER — HOSPITAL ENCOUNTER (EMERGENCY)
Facility: CLINIC | Age: 28
Discharge: HOME | End: 2023-11-02
Attending: STUDENT IN AN ORGANIZED HEALTH CARE EDUCATION/TRAINING PROGRAM | Admitting: STUDENT IN AN ORGANIZED HEALTH CARE EDUCATION/TRAINING PROGRAM
Payer: COMMERCIAL

## 2023-11-02 ENCOUNTER — APPOINTMENT (OUTPATIENT)
Dept: GENERAL RADIOLOGY | Facility: CLINIC | Age: 28
End: 2023-11-02
Attending: STUDENT IN AN ORGANIZED HEALTH CARE EDUCATION/TRAINING PROGRAM
Payer: COMMERCIAL

## 2023-11-02 VITALS
DIASTOLIC BLOOD PRESSURE: 71 MMHG | RESPIRATION RATE: 18 BRPM | HEART RATE: 63 BPM | SYSTOLIC BLOOD PRESSURE: 112 MMHG | WEIGHT: 197 LBS | HEIGHT: 70 IN | BODY MASS INDEX: 28.2 KG/M2 | OXYGEN SATURATION: 97 % | TEMPERATURE: 97.9 F

## 2023-11-02 DIAGNOSIS — R07.89 ATYPICAL CHEST PAIN: ICD-10-CM

## 2023-11-02 PROCEDURE — 71046 X-RAY EXAM CHEST 2 VIEWS: CPT

## 2023-11-02 PROCEDURE — 93010 ELECTROCARDIOGRAM REPORT: CPT | Performed by: STUDENT IN AN ORGANIZED HEALTH CARE EDUCATION/TRAINING PROGRAM

## 2023-11-02 PROCEDURE — 93005 ELECTROCARDIOGRAM TRACING: CPT | Performed by: STUDENT IN AN ORGANIZED HEALTH CARE EDUCATION/TRAINING PROGRAM

## 2023-11-02 PROCEDURE — 99283 EMERGENCY DEPT VISIT LOW MDM: CPT | Mod: 25 | Performed by: STUDENT IN AN ORGANIZED HEALTH CARE EDUCATION/TRAINING PROGRAM

## 2023-11-02 PROCEDURE — 99284 EMERGENCY DEPT VISIT MOD MDM: CPT | Mod: 25 | Performed by: STUDENT IN AN ORGANIZED HEALTH CARE EDUCATION/TRAINING PROGRAM

## 2023-11-02 ASSESSMENT — ENCOUNTER SYMPTOMS
NERVOUS/ANXIOUS: 1
SHORTNESS OF BREATH: 0

## 2023-11-02 ASSESSMENT — ACTIVITIES OF DAILY LIVING (ADL): ADLS_ACUITY_SCORE: 35

## 2023-11-02 NOTE — ED TRIAGE NOTES
Pt arrives from home w/ c/o chest pain that started yesterday after starting abx. Was seen at  and treated for bacterial infection. Flu and covid negative.     Triage Assessment (Adult)       Row Name 11/02/23 0219          Triage Assessment    Airway WDL WDL        Respiratory WDL    Respiratory WDL cough     Cough Frequency infrequent     Cough Type dry        Skin Circulation/Temperature WDL    Skin Circulation/Temperature WDL WDL        Cardiac WDL    Cardiac WDL chest pain        Chest Pain Assessment    Chest Pain Location anterior chest, left     Character sharp        Peripheral/Neurovascular WDL    Peripheral Neurovascular WDL WDL        Cognitive/Neuro/Behavioral WDL    Cognitive/Neuro/Behavioral WDL WDL

## 2023-11-02 NOTE — ED PROVIDER NOTES
History     Chief Complaint   Patient presents with    Chest Pain     HPI  Jorge Alberto Solorio is a 28 year old male who has no significant medical history who presents to the emergency department for evaluation of chest pain.  Patient states that he has had an upper respiratory infection for about the last week.  He notes having a productive cough and was seen in urgent care yesterday and was prescribed a Z-Jarrell.  He took the first 2 tablets of the Z-Jarrell.  His COVID and influenza were negative.  He had a CBC that showed a leukocytosis of 12.5, otherwise was normal.  Patient states that he woke up at about 130 this morning and had a tightness in the left side of his chest.  He states this is the first time this is ever happened and he was worried that it could be an allergic reaction to the antibiotic.  He currently denies having any chest pain.  He states it lasted about 10 minutes.  It did not radiate.  It is not related to exertion.  He has no pain with deep breaths.  Denies trouble breathing.  Denies fever.  Denies hemoptysis.  Denies pain or swelling in the calves.  No recent travel or surgery.  No nausea or vomiting.  No abdominal pain.  No arm or neck pain.  No diarrhea, melena, hematochezia.  He denies lip or tongue swelling.  No throat tightness or sensation of throat closing.  No rashes or itching.  He is not taking any medicines other than the azithromycin.  Denies family history of sudden cardiac death.  He denies alcohol, tobacco or drug use.  He denies any excessive heavy lifting or activity    Allergies:  No Known Allergies    Problem List:    There are no problems to display for this patient.       Past Medical History:    No past medical history on file.    Past Surgical History:    No past surgical history on file.    Family History:    Family History   Problem Relation Age of Onset    Lymphoma Mother 40        non hodgkins    Other Cancer Mother     Unknown/Adopted Father     Heart Disease Maternal  "Grandfather     Coronary Artery Disease Maternal Grandfather     Unknown/Adopted Paternal Grandmother     Unknown/Adopted Paternal Grandfather        Social History:  Marital Status:   [2]  Social History     Tobacco Use    Smoking status: Never     Passive exposure: Never    Smokeless tobacco: Never   Vaping Use    Vaping Use: Never used   Substance Use Topics    Alcohol use: Yes     Comment: occ    Drug use: Never        Medications:    azithromycin (ZITHROMAX) 250 MG tablet  busPIRone (BUSPAR) 30 MG tablet  CREATINE MONOHYDRATE PO  gabapentin (NEURONTIN) 300 MG capsule          Review of Systems  See HPI  Physical Exam   BP: 116/51  Pulse: 65  Temp: 97.9  F (36.6  C)  Resp: 18  Height: 177.8 cm (5' 10\")  Weight: 89.4 kg (197 lb)  SpO2: 98 %      Physical Exam  /51   Pulse 65   Temp 97.9  F (36.6  C) (Oral)   Resp 18   Ht 1.778 m (5' 10\")   Wt 89.4 kg (197 lb)   SpO2 98%   BMI 28.27 kg/m    General: alert, interactive, in no apparent distress  Head: atraumatic  Nose: no rhinorrhea or epistaxis  Ears: no external auditory canal discharge or bleeding.    Eyes: Sclera nonicteric. Conjunctiva noninjected. PERRL, EOMI  Mouth: no tonsillar erythema, edema, or exudate.  Moist mucous membranes  Neck: supple, moving spontaneously no midline cervical tenderness  Lungs: No increased work of breathing.  Clear to auscultation bilaterally.  CV: RRR, peripheral pulses palpable and symmetric.  Anterior chest wall nontender to palpation bilaterally.  Abdomen: soft, nt, nd, no guarding or rebound.   Extremities: Warm and well-perfused.  No edema or calf tenderness bilaterally.  Skin: no rash or diaphoresis  Neuro: CN II-XII grossly intact, strength 5/5 in UE and LEs bilaterally, sensation intact to light touch in UE and LEs bilaterally;     ED Course                 Procedures              EKG Interpretation:      Interpreted by Uzair Pierre MD  Time reviewed: 3:09 AM    Symptoms at time of EKG: None   Rhythm: " sinus bradycardia  Rate: 50-60  Axis: Normal  Ectopy: none  Conduction: normal  ST Segments/ T Waves: Early repolarization  Q Waves: none  Comparison to prior: No old EKG available    Clinical Impression: Sinus bradycardia with rate of 58.  Normal intervals.  Benign early repolarization.  No acute ischemic changes.  Overall reassuring EKG            Critical Care time:  none         Results for orders placed or performed during the hospital encounter of 11/02/23 (from the past 24 hour(s))   XR Chest 2 Views    Narrative    EXAM: XR CHEST 2 VIEWS  LOCATION: [facility]  DATE: 11/2/2023    INDICATION: Left chest pain  COMPARISON: None.      Impression    IMPRESSION: Negative chest.       Medications - No data to display    Assessments & Plan (with Medical Decision Making)     I have reviewed the nursing notes.    I have reviewed the findings, diagnosis, plan and need for follow up with the patient.      Medical Decision Making  Jorge Alberto Solorio is a 28 year old male who has no significant medical history who presents to the emergency department for evaluation of chest pain.  Vital signs are reviewed and reassuring.  Patient has a reassuring exam and is not having any chest pain at the time of my evaluation.  His EKG is reassuring and shows benign early repolarization, otherwise is reassuring as no acute changes.  I did obtain a chest x-ray and it was negative for pneumothorax or infiltrate.  I have a very low suspicion for cardiac etiology and I not think blood work is necessary.  I suspect his pain is secondary to his known URI.  Patient was concerned he was having allergic reaction, but he has no rash, no trouble breathing, no swelling and no other signs of allergic reaction and I explained to him that I do not think this is from her allergic reaction.  No tachycardia, hypoxia, trouble breathing or signs of DVT to suggest PE.  History and exam not consistent with aortic dissection.  Could  be musculoskeletal secondary to his coughing or due to pleurisy.  Regardless, patient appears well and I do not think further work-up is needed here in the ER.  Anticipatory guidance discussed with the patient.  Reassured him that I do not think anything dangerous is going on.  Recommended that he get plenty of rest, use Tylenol or ibuprofen as needed for pain and use expectorants and antitussives to help with any chest congestion.  Return precautions discussed.  All questions answered.  Patient expressed understanding.  He is discharged in stable condition.        New Prescriptions    No medications on file       Final diagnoses:   Atypical chest pain       11/2/2023   [facility] EMERGENCY DEPT       Uzair Pierre MD  11/02/23 0334

## 2023-11-02 NOTE — DISCHARGE INSTRUCTIONS
Today you were seen and evaluated in the emergency department for chest pain.  You had a chest x-ray that was reassuring.  It is unclear what is causing your pain, but I do not think it is anything dangerous. I suspect that your pain is related to your recent illness.  It is likely due to muscle pain from excessive coughing or due to inflammation of the lining of your lungs called pleurisy.  Regardless, both of these conditions should improve as you start feeling better.  There is no specific treatment.  Get plenty of rest and drink lots of fluids.  Take Tylenol or ibuprofen for pain.  You should finish your antibiotics.  Follow-up with your regular doctor with ongoing concerns.  Return to the ER with any new or worsening symptoms.

## 2023-11-27 ASSESSMENT — ANXIETY QUESTIONNAIRES
1. FEELING NERVOUS, ANXIOUS, OR ON EDGE: SEVERAL DAYS
3. WORRYING TOO MUCH ABOUT DIFFERENT THINGS: SEVERAL DAYS
7. FEELING AFRAID AS IF SOMETHING AWFUL MIGHT HAPPEN: MORE THAN HALF THE DAYS
GAD7 TOTAL SCORE: 8
GAD7 TOTAL SCORE: 8
7. FEELING AFRAID AS IF SOMETHING AWFUL MIGHT HAPPEN: MORE THAN HALF THE DAYS
4. TROUBLE RELAXING: MORE THAN HALF THE DAYS
8. IF YOU CHECKED OFF ANY PROBLEMS, HOW DIFFICULT HAVE THESE MADE IT FOR YOU TO DO YOUR WORK, TAKE CARE OF THINGS AT HOME, OR GET ALONG WITH OTHER PEOPLE?: SOMEWHAT DIFFICULT
6. BECOMING EASILY ANNOYED OR IRRITABLE: SEVERAL DAYS
IF YOU CHECKED OFF ANY PROBLEMS ON THIS QUESTIONNAIRE, HOW DIFFICULT HAVE THESE PROBLEMS MADE IT FOR YOU TO DO YOUR WORK, TAKE CARE OF THINGS AT HOME, OR GET ALONG WITH OTHER PEOPLE: SOMEWHAT DIFFICULT
2. NOT BEING ABLE TO STOP OR CONTROL WORRYING: SEVERAL DAYS
5. BEING SO RESTLESS THAT IT IS HARD TO SIT STILL: NOT AT ALL

## 2023-11-30 ENCOUNTER — TELEPHONE (OUTPATIENT)
Dept: FAMILY MEDICINE | Facility: CLINIC | Age: 28
End: 2023-11-30

## 2023-11-30 ENCOUNTER — VIRTUAL VISIT (OUTPATIENT)
Dept: FAMILY MEDICINE | Facility: CLINIC | Age: 28
End: 2023-11-30
Payer: COMMERCIAL

## 2023-11-30 DIAGNOSIS — F41.1 GAD (GENERALIZED ANXIETY DISORDER): ICD-10-CM

## 2023-11-30 PROCEDURE — 99214 OFFICE O/P EST MOD 30 MIN: CPT | Mod: VID

## 2023-11-30 RX ORDER — GABAPENTIN 300 MG/1
300 CAPSULE ORAL DAILY
Qty: 30 CAPSULE | Refills: 2 | Status: SHIPPED | OUTPATIENT
Start: 2023-11-30 | End: 2023-11-30

## 2023-11-30 RX ORDER — HYDROXYZINE HYDROCHLORIDE 25 MG/1
25-50 TABLET, FILM COATED ORAL 3 TIMES DAILY PRN
Qty: 60 TABLET | Refills: 0 | Status: SHIPPED | OUTPATIENT
Start: 2023-11-30 | End: 2024-02-05

## 2023-11-30 RX ORDER — GABAPENTIN 300 MG/1
600 CAPSULE ORAL 3 TIMES DAILY
Qty: 180 CAPSULE | Refills: 2 | Status: SHIPPED | OUTPATIENT
Start: 2023-11-30 | End: 2024-02-05

## 2023-11-30 ASSESSMENT — ENCOUNTER SYMPTOMS: NERVOUS/ANXIOUS: 1

## 2023-11-30 NOTE — TELEPHONE ENCOUNTER
Prior Authorization Retail Medication Request    Medication/Dose: Gabapentin 300mg cap  Diagnosis and ICD code (if different than what is on RX):    New/renewal/insurance change PA/secondary ins. PA:  Previously Tried and Failed:    Rationale:      Insurance allows qty 60 but pt increased from 1 capsule daily to 6 capsules daily. This dose needs prior authorization.      Insurance   Primary: paid/medco  Insurance ID:  [id number]    Secondary (if applicable):  Insurance ID:      Pharmacy Information (if different than what is on RX)  Name:    Phone:    Fax:     Thank you,    Rosalinda Reyes, [facility]  [place], [place]

## 2023-11-30 NOTE — TELEPHONE ENCOUNTER
Pt called, stating that during his VV they discussed his gabapentin (NEURONTIN) 300 MG capsule per pt he was suppose to change it to  take 600MG 3 times daily, on his script he noticed it still just says 300MG 1 time daily. He was asking if this could be changed.

## 2023-11-30 NOTE — TELEPHONE ENCOUNTER
PA Initiation    Medication: GABAPENTIN 300 MG PO CAPS  Insurance Company: [employer] Non-Specialty PA's - Phone [phone] Fax [phone]  Pharmacy Filling the Rx: [facility] [place], [place] - [facility]  Filling Pharmacy Phone: [phone]  Filling Pharmacy Fax: [phone]  Start Date: 11/30/2023

## 2023-11-30 NOTE — TELEPHONE ENCOUNTER
Prior Authorization Approval    Medication: GABAPENTIN 300 MG PO CAPS  Authorization Effective Date: 10/31/2023  Authorization Expiration Date: 11/29/2024  Approved Dose/Quantity:   Reference #:     Insurance Company: [employer] Non-Specialty PA's - Phone [phone] Fax [phone]  Expected CoPay: $    CoPay Card Available:      Financial Assistance Needed:   Which Pharmacy is filling the prescription: [facility] [place], [place] - [address] [place] [address]  Pharmacy Notified: YES  Patient Notified: **Instructed pharmacy to notify patient when script is ready to /ship.**

## 2023-11-30 NOTE — PROGRESS NOTES
Jorge Alberto is a 28 year old who is being evaluated via a billable video visit.      How would you like to obtain your AVS? MyChart  If the video visit is dropped, the invitation should be resent by: Text to cell phone: [phone]  Will anyone else be joining your video visit? No      Assessment & Plan     EMMANUELLE (generalized anxiety disorder)  After discussion today, we will increase gabapentin to 600 mg twice a day.  Patient had the most benefit with 600 mg in the morning and felt like it wore off in the early afternoon.  Discussed common side effects of concern of gabapentin and how some of the things online cannot be evidence-based information.  Verbalized understanding and would like to continue with the gabapentin.  We will also add in hydroxyzine as needed.  Discussed that this does have some sedative effect to some people so he will want to take the first dose when he is at home and not at work and see how he does.  If he does okay can go up to 2 tablets as needed for panic.  We will follow-up again in 6 weeks and see how things are going.  If consistent use with hydroxyzine I would want to get an EKG to check for prolonged QT.  Patient verbalized understanding.  - hydrOXYzine (ATARAX) 25 MG tablet; Take 1-2 tablets (25-50 mg) by mouth 3 times daily as needed for anxiety  - gabapentin (NEURONTIN) 300 MG capsule; Take 2 capsules (600 mg) by mouth 3 times daily for 90 days        6/17/2023    10:21 PM 10/22/2023     3:03 PM 11/27/2023     3:15 PM   EMMANUELLE-7 SCORE   Total Score 7 (mild anxiety) 17 (severe anxiety) 8 (mild anxiety)   Total Score 7 17 8       Nikko Azul is a 28 year old, presenting for the following health issues:  RECHECK (Follow up medication. Patient states his symptoms such as mood has worsen)      11/30/2023    11:05 AM   Additional Questions   Roomed by FRAN Edward is helpful when things are going okay. But when things are really bad it doesn't help. He doesn't feel rapid  "heart rate or chest pain, but does \"feel disconnected from my body\". Wondering if it is more panic attack. Can have really good stretches without these episodes. But other times multiple episodes per day.  He does feel like the gabapentin has been more beneficial than the BuSpar.  He just recently started taking this as he has hesitancies from what he read online.  Still not where he wants to be with his anxiety management.    History of Present Illness       Mental Health Follow-up:  Patient presents to follow-up on Anxiety.    Patient's anxiety since last visit has been:  Medium  The patient is having other symptoms associated with anxiety.  Any significant life events: No  Patient is feeling anxious or having panic attacks.  Patient has no concerns about alcohol or drug use.    He eats 2-3 servings of fruits and vegetables daily.He consumes 0 sweetened beverage(s) daily.He exercises with enough effort to increase his heart rate 30 to 60 minutes per day.  He exercises with enough effort to increase his heart rate 4 days per week.   He is taking medications regularly.      Review of Systems   Psychiatric/Behavioral:  The patient is nervous/anxious.           Objective         Vitals:  No vitals were obtained today due to virtual visit.    Physical Exam   GENERAL: Healthy, alert and no distress  EYES: Eyes grossly normal to inspection.  No discharge or erythema, or obvious scleral/conjunctival abnormalities.  RESP: No audible wheeze, cough, or visible cyanosis.  No visible retractions or increased work of breathing.    SKIN: Visible skin clear. No significant rash, abnormal pigmentation or lesions.  NEURO: Cranial nerves grossly intact.  Mentation and speech appropriate for age.  PSYCH: Mentation appears normal, affect normal/bright, judgement and insight intact, normal speech and appearance well-groomed.    Video-Visit Details    Type of service:  Video Visit     Originating Location (pt. Location): Home    Distant " Location (provider location):  On-site  Platform used for Video Visit: Marge    At the end of the visit, I confirmed understanding of what was discussed. Patient has no further questions or concerns that were brought up at this time.     Ramakrishna Li DNP, APRN, FNP-C

## 2023-11-30 NOTE — TELEPHONE ENCOUNTER
Updated the Rx. Please let patient know I am not sure what happened with this.     Ramakrishna Li DNP, APRN, FNP-C

## 2024-01-29 ENCOUNTER — OFFICE VISIT (OUTPATIENT)
Dept: FAMILY MEDICINE | Facility: CLINIC | Age: 29
End: 2024-01-29
Payer: COMMERCIAL

## 2024-01-29 VITALS
RESPIRATION RATE: 20 BRPM | BODY MASS INDEX: 28.98 KG/M2 | HEART RATE: 79 BPM | OXYGEN SATURATION: 99 % | DIASTOLIC BLOOD PRESSURE: 76 MMHG | SYSTOLIC BLOOD PRESSURE: 122 MMHG | TEMPERATURE: 97.1 F | WEIGHT: 202.4 LBS | HEIGHT: 70 IN

## 2024-01-29 DIAGNOSIS — L72.9 INFECTED CYST OF SKIN: Primary | ICD-10-CM

## 2024-01-29 DIAGNOSIS — L08.9 INFECTED CYST OF SKIN: Primary | ICD-10-CM

## 2024-01-29 PROCEDURE — 99213 OFFICE O/P EST LOW 20 MIN: CPT | Performed by: NURSE PRACTITIONER

## 2024-01-29 RX ORDER — SULFAMETHOXAZOLE/TRIMETHOPRIM 800-160 MG
1 TABLET ORAL 2 TIMES DAILY
Qty: 20 TABLET | Refills: 0 | Status: SHIPPED | OUTPATIENT
Start: 2024-01-29 | End: 2024-02-08

## 2024-01-29 ASSESSMENT — PAIN SCALES - GENERAL: PAINLEVEL: NO PAIN (0)

## 2024-01-29 NOTE — PROGRESS NOTES
Assessment & Plan     Infected cyst of skin  Patient presents with infected cyst of left lower chin under neck area, started approximately 1 month ago after using a different razor.  Likely started from an ingrown hair.  Patient has been hot compress eating.  Denies any fevers, some minimal drainage from time to time.  At this time would recommend treatment with antibiotic and hot compresses 2-3 times daily.  If symptoms or not improving by the end of the week, patient to notify provider for possible alternate antibiotic and dermatology referral.  - sulfamethoxazole-trimethoprim (BACTRIM DS) 800-160 MG tablet; Take 1 tablet by mouth 2 times daily for 10 days          See Patient Instructions After discussion with patient, patient verbalizes and agreeable to receive AVS instructions via My Chart, not printed today     Subjective   Jorge Alberto is a 28 year old, presenting for the following health issues:  Cyst (Jawline)        1/29/2024     9:13 AM   Additional Questions   Roomed by Elena PRITCHARD CMA     History of Present Illness       Reason for visit:  Painful cyst, right jawline  Symptom onset:  3-4 weeks ago  Symptoms include:  Red painful, doesn't think it is getting bigger, but won't go away, itchy at times  Symptom intensity:  Moderate  What makes it worse:  Picking at it    He eats 4 or more servings of fruits and vegetables daily.He consumes 0 sweetened beverage(s) daily.He exercises with enough effort to increase his heart rate 30 to 60 minutes per day.  He exercises with enough effort to increase his heart rate 4 days per week.   He is taking medications regularly.     Tried hot compresses ~ did about 3 times here and there   No fevers  Some drainage from time to time, little yellowish and then more clear/bloody   Painful at times   Used a different razor prior to onset           Review of Systems  Constitutional, HEENT, cardiovascular, pulmonary, gi and gu systems are negative, except as otherwise noted.     "  Objective    /76 (BP Location: Right arm, Patient Position: Sitting, Cuff Size: Adult Regular)   Pulse 79   Temp 97.1  F (36.2  C) (Tympanic)   Resp 20   Ht 1.778 m (5' 10\")   Wt 91.8 kg (202 lb 6.4 oz)   SpO2 99%   BMI 29.04 kg/m    Body mass index is 29.04 kg/m .  Physical Exam   GENERAL: alert and no distress  NECK: no adenopathy, no asymmetry, masses, or scars  SKIN: round, soft cyst present on underside of left jawline/upper neck without fluctuance with dried drainage and surrounding erythema  PSYCH: mentation appears normal, affect normal/bright    Diagnostic Test Results:  Labs reviewed in Epic  none          Signed Electronically by: Yumiko Parks DNP, APRN-CNP       Chart documentation with Dragon Voice recognition Software. Although reviewed after completion, some words and grammatical errors may remain.   "

## 2024-02-05 ENCOUNTER — OFFICE VISIT (OUTPATIENT)
Dept: FAMILY MEDICINE | Facility: CLINIC | Age: 29
End: 2024-02-05
Payer: COMMERCIAL

## 2024-02-05 VITALS
TEMPERATURE: 97.5 F | HEART RATE: 91 BPM | SYSTOLIC BLOOD PRESSURE: 138 MMHG | WEIGHT: 203 LBS | RESPIRATION RATE: 16 BRPM | BODY MASS INDEX: 29.06 KG/M2 | DIASTOLIC BLOOD PRESSURE: 72 MMHG | HEIGHT: 70 IN | OXYGEN SATURATION: 98 %

## 2024-02-05 DIAGNOSIS — F41.9 ANXIETY: Primary | ICD-10-CM

## 2024-02-05 DIAGNOSIS — L72.3 SEBACEOUS CYST: ICD-10-CM

## 2024-02-05 DIAGNOSIS — F41.1 GAD (GENERALIZED ANXIETY DISORDER): ICD-10-CM

## 2024-02-05 PROCEDURE — 99214 OFFICE O/P EST MOD 30 MIN: CPT | Performed by: FAMILY MEDICINE

## 2024-02-05 RX ORDER — HYDROXYZINE HYDROCHLORIDE 25 MG/1
25-50 TABLET, FILM COATED ORAL 3 TIMES DAILY PRN
Qty: 60 TABLET | Refills: 3 | Status: SHIPPED | OUTPATIENT
Start: 2024-02-05

## 2024-02-05 SDOH — HEALTH STABILITY: PHYSICAL HEALTH: ON AVERAGE, HOW MANY MINUTES DO YOU ENGAGE IN EXERCISE AT THIS LEVEL?: 40 MIN

## 2024-02-05 SDOH — HEALTH STABILITY: PHYSICAL HEALTH: ON AVERAGE, HOW MANY DAYS PER WEEK DO YOU ENGAGE IN MODERATE TO STRENUOUS EXERCISE (LIKE A BRISK WALK)?: 4 DAYS

## 2024-02-05 ASSESSMENT — ANXIETY QUESTIONNAIRES
7. FEELING AFRAID AS IF SOMETHING AWFUL MIGHT HAPPEN: MORE THAN HALF THE DAYS
6. BECOMING EASILY ANNOYED OR IRRITABLE: SEVERAL DAYS
2. NOT BEING ABLE TO STOP OR CONTROL WORRYING: MORE THAN HALF THE DAYS
5. BEING SO RESTLESS THAT IT IS HARD TO SIT STILL: SEVERAL DAYS
GAD7 TOTAL SCORE: 11
1. FEELING NERVOUS, ANXIOUS, OR ON EDGE: MORE THAN HALF THE DAYS
GAD7 TOTAL SCORE: 11
IF YOU CHECKED OFF ANY PROBLEMS ON THIS QUESTIONNAIRE, HOW DIFFICULT HAVE THESE PROBLEMS MADE IT FOR YOU TO DO YOUR WORK, TAKE CARE OF THINGS AT HOME, OR GET ALONG WITH OTHER PEOPLE: SOMEWHAT DIFFICULT
3. WORRYING TOO MUCH ABOUT DIFFERENT THINGS: MORE THAN HALF THE DAYS

## 2024-02-05 ASSESSMENT — PATIENT HEALTH QUESTIONNAIRE - PHQ9
5. POOR APPETITE OR OVEREATING: SEVERAL DAYS
SUM OF ALL RESPONSES TO PHQ QUESTIONS 1-9: 4

## 2024-02-05 ASSESSMENT — PAIN SCALES - GENERAL: PAINLEVEL: NO PAIN (0)

## 2024-02-05 ASSESSMENT — SOCIAL DETERMINANTS OF HEALTH (SDOH): HOW OFTEN DO YOU GET TOGETHER WITH FRIENDS OR RELATIVES?: ONCE A WEEK

## 2024-02-05 NOTE — PROGRESS NOTES
Assessment & Plan     EMMANUELLE (generalized anxiety disorder)  Father passed away in July 2023.  Has been finding more family dynamics which has increased his anxiety.  Feels like he has had some underlying anxiety for longer period time though.  Has tried to use BuSpar and also gabapentin but is not real helpful with the BuSpar over extended period time and gabapentin with side effects.  --He is on hydroxyzine as needed 1-2 times per week which is helpful.  --Plan to start on sertraline 25 mg daily for first week with up titration to 50 mg after that.  Follow-up in clinic in 4 weeks.  No SI or HI.  No tobacco or drug abuse.  Follows with counselor and will continue this.  -- medication discussed in full. All questions answered.   - sertraline (ZOLOFT) 50 MG tablet  Dispense: 90 tablet; Refill: 0  - hydrOXYzine HCl (ATARAX) 25 MG tablet  Dispense: 60 tablet; Refill: 3    Sebaceous cyst  Decreasing in size and improving. On Bactrim x 10 days. Advised to continue with antibiotic and also warm compresses. Follow up with Dermatology if does not continue to improve or if it worsens.     The risks, benefits and treatment options of prescribed medications or other treatments have been discussed with the patient. The patient verbalized their understanding and should call or follow up if no improvement or if they develop further problems.      Counseling  Appropriate preventive services were discussed with this patient, including applicable screening as appropriate for fall prevention, nutrition, physical activity, Tobacco-use cessation, weight loss and cognition.  Checklist reviewing preventive services available has been given to the patient.  Reviewed patient's diet, addressing concerns and/or questions.         Nikko Azul is a 28 year old, presenting for the following health issues:  Cyst, Recheck Medication, and Anxiety        2/5/2024     7:41 AM   Additional Questions   Roomed by Janene TURNER MA   Accompanied by  self         2/5/2024     7:41 AM   Patient Reported Additional Medications   Patient reports taking the following new medications Creatine-monohydrate- unknown dose once daily,     History of Present Illness       Reason for visit:  Painful cyst    He eats 4 or more servings of fruits and vegetables daily.He consumes 0 sweetened beverage(s) daily.He exercises with enough effort to increase his heart rate 30 to 60 minutes per day.  He exercises with enough effort to increase his heart rate 4 days per week.   He is taking medications regularly.       Cyst   Urgent care visit on 1/29/2024.  On Bactrim for 10 days.   Has been improving.   Doing warm compresses which seems to be helping.       Anxiety Follow-Up  How are you doing with your anxiety since your last visit? Improved   Are you having other symptoms that might be associated with anxiety? Yes:  occasional panic attack, disrupted sleep. Hydroxyzine helps.  Have you had a significant life event? No   Are you feeling depressed? No  Do you have any concerns with your use of alcohol or other drugs? No    Dad passed away when he was 58 in July of 2023.   Reports having some interesting family dynamics.   Has been going to therapy for about a year now which is helpful.   Follows with Bette Lopez through [facility] for counseling.   In the past on buspar and also gabapentin in the past.     Took the buspar, and helped initially but then started to lose its efficacy.   Tried gabapentin and resulted in bad acne and very oily face.   Using hydroxyzine as needed. Helps with sleep. Takes 1 tablet a couple times per week.       Social History     Tobacco Use    Smoking status: Never     Passive exposure: Never    Smokeless tobacco: Never   Vaping Use    Vaping Use: Never used   Substance Use Topics    Alcohol use: Yes     Comment: occ    Drug use: Never         6/17/2023    10:21 PM 10/22/2023     3:03 PM 11/27/2023     3:15 PM   EMMANUELLE-7 SCORE   Total Score 7 (mild anxiety) 17  "(severe anxiety) 8 (mild anxiety)   Total Score 7 17 8         6/19/2023    12:42 PM   PHQ   PHQ-9 Total Score 4   Q9: Thoughts of better off dead/self-harm past 2 weeks Not at all         Objective    /72 (BP Location: Right arm, Patient Position: Sitting, Cuff Size: Adult Regular)   Pulse 91   Temp 97.5  F (36.4  C) (Tympanic)   Resp 16   Ht 1.778 m (5' 10\")   Wt 92.1 kg (203 lb)   SpO2 98%   BMI 29.13 kg/m    Body mass index is 29.13 kg/m .  Physical Exam   General: alert, cooperative, no acute distress   HEENT: cyst under the chin on the left neckline. No surrounding erythema. No drainage.   CV: RRR, no murmur  Resp: non-labored breathing, clear to auscultation, no wheezing or rales   Extremities: No peripheral edema, calves non-tender.   Appearance: Patient appears well groomed and appropriately dressed.   Orientation: Patient alert and oriented to person, place, and time.  Behavior: Appropriate to setting and is cooperative with mental status examination. No overactivity or catatonia.  Speech: Organized, clear, and concise, with appropriate volume and tone.  Thought: Able to think abstractly. No evidence of tangentiality or circumstantiality.  Perception: No auditory or visual hallucinations. No delusions.  Mood: No evidence of euphoria or dysphoria. No lability.  Affect: Congruent to mood. Not flattened or narrowed.  Insight/Judgement: Insight and judgment intact.      Signed Electronically by: Taz Holm DO    "

## 2024-02-28 ASSESSMENT — ANXIETY QUESTIONNAIRES
GAD7 TOTAL SCORE: 0
7. FEELING AFRAID AS IF SOMETHING AWFUL MIGHT HAPPEN: NOT AT ALL
5. BEING SO RESTLESS THAT IT IS HARD TO SIT STILL: NOT AT ALL
IF YOU CHECKED OFF ANY PROBLEMS ON THIS QUESTIONNAIRE, HOW DIFFICULT HAVE THESE PROBLEMS MADE IT FOR YOU TO DO YOUR WORK, TAKE CARE OF THINGS AT HOME, OR GET ALONG WITH OTHER PEOPLE: NOT DIFFICULT AT ALL
GAD7 TOTAL SCORE: 0
7. FEELING AFRAID AS IF SOMETHING AWFUL MIGHT HAPPEN: NOT AT ALL
3. WORRYING TOO MUCH ABOUT DIFFERENT THINGS: NOT AT ALL
4. TROUBLE RELAXING: NOT AT ALL
8. IF YOU CHECKED OFF ANY PROBLEMS, HOW DIFFICULT HAVE THESE MADE IT FOR YOU TO DO YOUR WORK, TAKE CARE OF THINGS AT HOME, OR GET ALONG WITH OTHER PEOPLE?: NOT DIFFICULT AT ALL
2. NOT BEING ABLE TO STOP OR CONTROL WORRYING: NOT AT ALL
6. BECOMING EASILY ANNOYED OR IRRITABLE: NOT AT ALL
1. FEELING NERVOUS, ANXIOUS, OR ON EDGE: NOT AT ALL

## 2024-03-01 ENCOUNTER — OFFICE VISIT (OUTPATIENT)
Dept: FAMILY MEDICINE | Facility: CLINIC | Age: 29
End: 2024-03-01
Payer: COMMERCIAL

## 2024-03-01 VITALS
WEIGHT: 199 LBS | BODY MASS INDEX: 28.49 KG/M2 | HEIGHT: 70 IN | OXYGEN SATURATION: 96 % | TEMPERATURE: 97.6 F | SYSTOLIC BLOOD PRESSURE: 120 MMHG | RESPIRATION RATE: 20 BRPM | DIASTOLIC BLOOD PRESSURE: 74 MMHG | HEART RATE: 66 BPM

## 2024-03-01 DIAGNOSIS — F41.9 ANXIETY: ICD-10-CM

## 2024-03-01 DIAGNOSIS — F41.1 GAD (GENERALIZED ANXIETY DISORDER): Primary | ICD-10-CM

## 2024-03-01 PROCEDURE — 99213 OFFICE O/P EST LOW 20 MIN: CPT | Performed by: FAMILY MEDICINE

## 2024-03-01 ASSESSMENT — ENCOUNTER SYMPTOMS: NERVOUS/ANXIOUS: 1

## 2024-03-01 ASSESSMENT — PAIN SCALES - GENERAL: PAINLEVEL: NO PAIN (0)

## 2024-03-01 NOTE — PROGRESS NOTES
Assessment & Plan     EMMANUELLE (generalized anxiety disorder)  Anxiety  --Symptoms have improved after initiation of sertraline.  Currently on 50 mg daily.  His racing thoughts have improved.  Tolerating medication without any side effects at this time.  Continues with therapy [facility] [place] for counseling twice a month.   -- wishes to continue on current dose of sertraline.  -- Follow up in 6 months or sooner if needed.   - sertraline (ZOLOFT) 50 MG tablet  Dispense: 90 tablet; Refill: 1    The risks, benefits and treatment options of prescribed medications or other treatments have been discussed with the patient. The patient verbalized their understanding and should call or follow up if no improvement or if they develop further problems.      Nikko Azul is a 28 year old, presenting for the following health issues:  Anxiety        3/1/2024    12:44 PM   Additional Questions   Roomed by Felisha Miller    History of Present Illness       Reason for visit:  Anxiety    He eats 2-3 servings of fruits and vegetables daily.He consumes 0 sweetened beverage(s) daily.He exercises with enough effort to increase his heart rate 30 to 60 minutes per day.  He exercises with enough effort to increase his heart rate 4 days per week.   He is taking medications regularly.       Overall improved.   Tolerating the sertraline medication. Initially with some GI upset but this has resolved.   Has had some increased stress recently over the last couple of days.   Has hydroxyzine as needed. Has not used in the last month.   Racing thoughts have improved.   Reports continues to have some physical manifestations of his anxiety but improved from prior.   Follows with [facility] [place] for counseling twice a month.         Review of Systems  Constitutional, HEENT, cardiovascular, pulmonary, gi and gu systems are negative, except as otherwise noted.      Objective    /74 (BP Location: Right arm, Patient  "Position: Chair, Cuff Size: Adult Large)   Pulse 66   Temp 97.6  F (36.4  C) (Oral)   Resp 20   Ht 1.765 m (5' 9.5\")   Wt 90.3 kg (199 lb)   SpO2 96%   BMI 28.97 kg/m    Body mass index is 28.97 kg/m .  Physical Exam   Appearance: Patient appears well groomed and appropriately dressed.   Orientation: Patient alert and oriented to person, place, and time.  Behavior: Appropriate to setting and is cooperative with mental status examination. No overactivity or catatonia.  Speech: Organized, clear, and concise, with appropriate volume and tone.  Thought: Able to think abstractly. No evidence of tangentiality or circumstantiality.  Perception: No auditory or visual hallucinations. No delusions.  Mood: No evidence of euphoria or dysphoria. No lability.  Affect: Congruent to mood. Not flattened or narrowed.  Insight/Judgement: Insight and judgment intact.      Signed Electronically by: Taz Holm DO    "

## 2024-03-01 NOTE — PATIENT INSTRUCTIONS
Continue on sertraline 50 mg daily.     Follow up with myself in 6 months or sooner if needed.

## 2024-05-11 ENCOUNTER — HEALTH MAINTENANCE LETTER (OUTPATIENT)
Age: 29
End: 2024-05-11

## 2024-10-21 ENCOUNTER — MYC REFILL (OUTPATIENT)
Dept: FAMILY MEDICINE | Facility: CLINIC | Age: 29
End: 2024-10-21
Payer: COMMERCIAL

## 2024-10-21 DIAGNOSIS — F41.9 ANXIETY: ICD-10-CM

## 2024-10-21 DIAGNOSIS — F41.1 GAD (GENERALIZED ANXIETY DISORDER): ICD-10-CM

## 2024-10-22 RX ORDER — HYDROXYZINE HYDROCHLORIDE 25 MG/1
25-50 TABLET, FILM COATED ORAL 3 TIMES DAILY PRN
Qty: 60 TABLET | Refills: 3 | Status: SHIPPED | OUTPATIENT
Start: 2024-10-22

## 2024-12-23 ENCOUNTER — OFFICE VISIT (OUTPATIENT)
Dept: FAMILY MEDICINE | Facility: CLINIC | Age: 29
End: 2024-12-23
Payer: COMMERCIAL

## 2024-12-23 ENCOUNTER — LAB (OUTPATIENT)
Dept: LAB | Facility: CLINIC | Age: 29
End: 2024-12-23
Payer: COMMERCIAL

## 2024-12-23 VITALS
TEMPERATURE: 98.5 F | HEIGHT: 70 IN | SYSTOLIC BLOOD PRESSURE: 109 MMHG | HEART RATE: 86 BPM | OXYGEN SATURATION: 96 % | WEIGHT: 224.4 LBS | BODY MASS INDEX: 32.13 KG/M2 | DIASTOLIC BLOOD PRESSURE: 82 MMHG | RESPIRATION RATE: 20 BRPM

## 2024-12-23 DIAGNOSIS — R53.83 FATIGUE, UNSPECIFIED TYPE: ICD-10-CM

## 2024-12-23 DIAGNOSIS — R53.83 FATIGUE, UNSPECIFIED TYPE: Primary | ICD-10-CM

## 2024-12-23 LAB
ANION GAP SERPL CALCULATED.3IONS-SCNC: 10 MMOL/L (ref 7–15)
BUN SERPL-MCNC: 11.9 MG/DL (ref 6–20)
CALCIUM SERPL-MCNC: 10.1 MG/DL (ref 8.8–10.4)
CHLORIDE SERPL-SCNC: 102 MMOL/L (ref 98–107)
CK SERPL-CCNC: 502 U/L (ref 39–308)
CREAT SERPL-MCNC: 1.48 MG/DL (ref 0.67–1.17)
EGFRCR SERPLBLD CKD-EPI 2021: 65 ML/MIN/1.73M2
ERYTHROCYTE [DISTWIDTH] IN BLOOD BY AUTOMATED COUNT: 11.7 % (ref 10–15)
FLUAV AG SPEC QL IA: NEGATIVE
FLUBV AG SPEC QL IA: NEGATIVE
GLUCOSE SERPL-MCNC: 99 MG/DL (ref 70–99)
HCO3 SERPL-SCNC: 28 MMOL/L (ref 22–29)
HCT VFR BLD AUTO: 44.8 % (ref 40–53)
HGB BLD-MCNC: 15.4 G/DL (ref 13.3–17.7)
MCH RBC QN AUTO: 30.1 PG (ref 26.5–33)
MCHC RBC AUTO-ENTMCNC: 34.4 G/DL (ref 31.5–36.5)
MCV RBC AUTO: 88 FL (ref 78–100)
PLATELET # BLD AUTO: 235 10E3/UL (ref 150–450)
POTASSIUM SERPL-SCNC: 4.5 MMOL/L (ref 3.4–5.3)
RBC # BLD AUTO: 5.12 10E6/UL (ref 4.4–5.9)
SARS-COV-2 RNA RESP QL NAA+PROBE: NEGATIVE
SODIUM SERPL-SCNC: 140 MMOL/L (ref 135–145)
TSH SERPL DL<=0.005 MIU/L-ACNC: 1.24 UIU/ML (ref 0.3–4.2)
WBC # BLD AUTO: 6.4 10E3/UL (ref 4–11)

## 2024-12-23 PROCEDURE — 87635 SARS-COV-2 COVID-19 AMP PRB: CPT | Performed by: FAMILY MEDICINE

## 2024-12-23 PROCEDURE — 80048 BASIC METABOLIC PNL TOTAL CA: CPT | Performed by: FAMILY MEDICINE

## 2024-12-23 PROCEDURE — 87804 INFLUENZA ASSAY W/OPTIC: CPT | Performed by: FAMILY MEDICINE

## 2024-12-23 PROCEDURE — 82550 ASSAY OF CK (CPK): CPT | Performed by: FAMILY MEDICINE

## 2024-12-23 PROCEDURE — 36415 COLL VENOUS BLD VENIPUNCTURE: CPT | Performed by: FAMILY MEDICINE

## 2024-12-23 PROCEDURE — 85027 COMPLETE CBC AUTOMATED: CPT

## 2024-12-23 PROCEDURE — 84443 ASSAY THYROID STIM HORMONE: CPT | Performed by: FAMILY MEDICINE

## 2024-12-23 PROCEDURE — 99214 OFFICE O/P EST MOD 30 MIN: CPT | Performed by: FAMILY MEDICINE

## 2024-12-23 ASSESSMENT — PAIN SCALES - GENERAL: PAINLEVEL_OUTOF10: MILD PAIN (2)

## 2024-12-23 NOTE — PROGRESS NOTES
Assessment & Plan     Fatigue, unspecified type  Differentials discussed in detail including infectious, metabolic etiology.  Influenza test negative.  CBC, BMP, CK, TSH and COVID-19 test ordered for further evaluation.  Recommended well hydration, healthy diet, over-the-counter analgesia and to follow-up in 2 weeks or earlier if needed.  Patient understood and in agreement with above plan.  All questions answered.  - Influenza A & B Antigen - Clinic Collect  - COVID-19 Virus (Coronavirus) by PCR Nose  - Basic metabolic panel  (Ca, Cl, CO2, Creat, Gluc, K, Na, BUN); Future  - CK total; Future  - TSH with free T4 reflex; Future  - TSH with free T4 reflex  - CK total  - CBC  - Basic metabolic panel  (Ca, Cl, CO2, Creat, Gluc, K, Na, BUN)        Nikko Azul is a 29 year old, presenting for the following health issues:  Generalized Body Aches        12/23/2024     8:15 AM   Additional Questions   Roomed by Janene MCKNIGHT LPN   Accompanied by self     History of Present Illness       Reason for visit:  Body aches, fatigue, chills  Symptom onset:  1-3 days ago  Symptom intensity:  Moderate  Symptom progression:  Staying the same  Had these symptoms before:  Yes  What makes it worse:  No  What makes it better:  Ibuprofen, shower   He is taking medications regularly.       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   Acute Illness  Acute illness concerns: Body aches  Onset/Duration: Saturday-   Symptoms:  Fever: YES  Chills/Sweats: YES  Headache (location?): no  Sinus Pressure: No  Conjunctivitis:  No  Ear Pain: no  Rhinorrhea: No  Congestion: YES- not new from URI  Sore Throat: No  Cough: no  Wheeze: No  Decreased Appetite: YES  Nausea: No  Vomiting: No  Diarrhea: No  Dysuria/Freq.: No  Dysuria or Hematuria: No  Fatigue/Achiness: YES  Sick/Strep Exposure: YES  Therapies tried and outcome: None      Review of Systems  Constitutional, neuro,  "ENT, endocrine, pulmonary, cardiac, gastrointestinal, genitourinary, musculoskeletal, integument and psychiatric systems are negative, except as otherwise noted.      Objective    /82   Pulse 86   Temp 98.5  F (36.9  C) (Tympanic)   Resp 20   Ht 1.765 m (5' 9.5\")   Wt 101.8 kg (224 lb 6.4 oz)   SpO2 96%   BMI 32.66 kg/m    Body mass index is 32.66 kg/m .  Physical Exam   GENERAL: alert and no distress  EYES: Eyes grossly normal to inspection, PERRL and conjunctivae and sclerae normal  HENT: normal cephalic/atraumatic, nose and mouth without ulcers or lesions, oropharynx clear, and oral mucous membranes moist  NECK: no adenopathy, no asymmetry, masses, or scars  RESP: lungs clear to auscultation - no rales, rhonchi or wheezes  CV: regular rates and rhythm, normal S1 S2, no S3 or S4, and no murmur, click or rub  MS: no gross musculoskeletal defects noted, no edema  SKIN: no suspicious lesions or rashes  NEURO: Normal strength and tone, mentation intact and speech normal  PSYCH: mentation appears normal, affect normal/bright    Results for orders placed or performed in visit on 12/23/24   Influenza A & B Antigen - Clinic Collect     Status: Normal    Specimen: Nose; Swab   Result Value Ref Range    Influenza A antigen Negative Negative    Influenza B antigen Negative Negative    Narrative    Test results must be correlated with clinical data. If necessary, results should be confirmed by a molecular assay or viral culture.         Signed Electronically by: Luis Montanez MD    "

## 2025-02-19 ENCOUNTER — MYC MEDICAL ADVICE (OUTPATIENT)
Dept: FAMILY MEDICINE | Facility: CLINIC | Age: 30
End: 2025-02-19
Payer: COMMERCIAL

## 2025-02-19 DIAGNOSIS — M65.4 DE QUERVAIN'S DISEASE (TENOSYNOVITIS): Primary | ICD-10-CM

## 2025-02-19 NOTE — TELEPHONE ENCOUNTER
Dr Holm    Pt requesting PT referral for his De Quervain's disease after trying rest/ice/voltaren without improvement      Nate Gardner RN

## 2025-02-20 ENCOUNTER — PATIENT OUTREACH (OUTPATIENT)
Dept: CARE COORDINATION | Facility: CLINIC | Age: 30
End: 2025-02-20
Payer: COMMERCIAL

## 2025-02-24 ENCOUNTER — ANCILLARY PROCEDURE (OUTPATIENT)
Dept: GENERAL RADIOLOGY | Facility: CLINIC | Age: 30
End: 2025-02-24
Attending: FAMILY MEDICINE
Payer: COMMERCIAL

## 2025-02-24 ENCOUNTER — OFFICE VISIT (OUTPATIENT)
Dept: ORTHOPEDICS | Facility: CLINIC | Age: 30
End: 2025-02-24
Attending: FAMILY MEDICINE
Payer: COMMERCIAL

## 2025-02-24 DIAGNOSIS — M65.4 DE QUERVAIN'S DISEASE (TENOSYNOVITIS): ICD-10-CM

## 2025-02-24 DIAGNOSIS — M25.532 LEFT WRIST PAIN: Primary | ICD-10-CM

## 2025-02-24 DIAGNOSIS — M25.532 LEFT WRIST PAIN: ICD-10-CM

## 2025-02-24 PROCEDURE — 73110 X-RAY EXAM OF WRIST: CPT | Mod: TC | Performed by: INTERNAL MEDICINE

## 2025-02-24 PROCEDURE — 99204 OFFICE O/P NEW MOD 45 MIN: CPT | Mod: 25 | Performed by: FAMILY MEDICINE

## 2025-02-24 PROCEDURE — 76942 ECHO GUIDE FOR BIOPSY: CPT | Mod: LT | Performed by: FAMILY MEDICINE

## 2025-02-24 PROCEDURE — 20550 NJX 1 TENDON SHEATH/LIGAMENT: CPT | Mod: LT | Performed by: FAMILY MEDICINE

## 2025-02-24 RX ADMIN — BETAMETHASONE SODIUM PHOSPHATE AND BETAMETHASONE ACETATE 6 MG: 3; 3 INJECTION, SUSPENSION INTRA-ARTICULAR; INTRALESIONAL; INTRAMUSCULAR; SOFT TISSUE at 15:31

## 2025-02-24 RX ADMIN — ROPIVACAINE HYDROCHLORIDE 2 ML: 5 INJECTION, SOLUTION EPIDURAL; INFILTRATION; PERINEURAL at 15:31

## 2025-02-24 NOTE — PATIENT INSTRUCTIONS
# Left Wrist De Quervain's Tenosynovitis: Jorge Alberto Solorio  was seen today for left wrist pain. Symptoms had been going on for 8 weeks in the setting of lifting and holding his . On examination there are positive findings of tenderness to palpation over the 1st dorsal compartment, pain with stretching tendon. Imaging findings showed no arthritis. Likely cause of patient's condition due to De Quervain's Tenosynovitis.   Counseled patient on nature of condition and treatment options.  Given this plan as below, follow-up 1 mon as needed.     Image Findings: negative left wrist x-ray  Treatment: Activities as tolerated, home exercises given today, left wrist brace at night  Job: As tolerated  Medications/Injections: Limited tylenol/ibuprofen for pain for 1-2 weeks, Topical Voltaren gel, left De Quervain's steroid injection  Follow-up: In one month if symptoms do not improve, sooner if worsening  Can consider repeat evaluation    Please call [phone]   Ask for my team if you have any questions or concerns    If you have not yet received the influenza vaccine but would like to get one, please call  [phone] or you can schedule via [employer]    It was great seeing you today!    Carter Peace MD, [employer]     Raad Thumb Brace Spica Splint for Right & Left Hand, De Quervain's Tenosynovitis Splint, Breathable Wrist & Thumb Stabilizer

## 2025-02-24 NOTE — LETTER
2025      Jorge Alberto Solorio  [address] [place] [address]      Dear Colleague,    Thank you for referring your patient, Jorge Alberto Solorio, to the [facility]. Please see a copy of my visit note below.    ASSESSMENT & PLAN    Jorge Alberto was seen today for pain.    Diagnoses and all orders for this visit:    Left wrist pain  -     XR Wrist Left G/E 3 Views; Future  -     Hand / Upper Extremity Injection/Arthrocentesis: L extensor compartment 1    De Quervain's disease (tenosynovitis)  -     Orthopedic  Referral  -     Hand / Upper Extremity Injection/Arthrocentesis: L extensor compartment 1      This issue is acute on chronic and Worsening.    # Left Wrist De Quervain's Tenosynovitis: Jorge Alberto Solorio  was seen today for left wrist pain. Symptoms had been going on for 8 weeks in the setting of lifting and holding his . On examination there are positive findings of tenderness to palpation over the 1st dorsal compartment, pain with stretching tendon. Imaging findings showed no arthritis. Likely cause of patient's condition due to De Quervain's Tenosynovitis.   Counseled patient on nature of condition and treatment options.  Given this plan as below, follow-up 1 mon as needed.     Image Findings: negative left wrist x-ray  Treatment: Activities as tolerated, home exercises given today, left wrist brace at night  Job: As tolerated  Medications/Injections: Limited tylenol/ibuprofen for pain for 1-2 weeks, Topical Voltaren gel, left De Quervain's steroid injection  Follow-up: In one month if symptoms do not improve, sooner if worsening  Can consider repeat evaluation    Carter Peace MD  [facility]    I was present with the resident during the history and exam.  I discussed the case with the resident and agree with the findings as documented in the assessment and plan.     -----  Chief Complaint   Patient presents with      Left Wrist - Pain       SUBJECTIVE  Jorge Alberto Solorio is a/an 29 year old male who is seen in consultation at the request of  Taz Holm D.O. for evaluation of left wrist pain. Reviewed PCP notes.    The patient is seen by themselves.  The patient is Right handed    Onset: 4 month(s) ago. Reports insidious onset without acute precipitating event. 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      REVIEW OF SYSTEMS:  Review of Systems  Constitutional, HEENT, cardiovascular, pulmonary, gi and gu systems are negative, except as otherwise noted.    OBJECTIVE:  There were no vitals taken for this visit.   General: healthy, alert and in no distress  HEENT: no scleral icterus or conjunctival erythema  Skin: no suspicious lesions or rash. No jaundice.  CV: distal perfusion intact    Resp: normal respiratory effort without conversational dyspnea   Psych: normal mood and affect  Gait: normal steady gait with appropriate coordination and balance    Neuro: Normal light sensory exam of left upper extremity     Ortho Exam   LEFT WRIST  Inspection:    No swelling or obvious deformity or asymmetry  Palpation:    Tender about the 1st dorsal compartment. Remainder of bony and ligamentous line marks are nontender.     Metacarpals: normal    Thumb: normal    Fingers: normal  Range of Motion:    Full (active and passive) flexion, extension, pronation/supination, and ulnar/radial deviation.  Strength:    No deficits in flexion, extension, ulnar/radial deviation, or  strength.. Normal pinch  strength.  Special Tests:    Positive: Finkelstein's    Negative: Tinel's (carpal tunnel), Tinel's (cubital tunnel), CMC grind, thenar eminence wasting, hypothenar eminence wasting.     RADIOLOGY:  I independently ordered, visualized and reviewed these images with the patient    Left wrist x-rays negative      Review of external notes as documented elsewhere in note  Review of the result(s) of each unique test - left wrist x-rays        Disclaimer: This note consists of symbols derived from keyboarding, dictation and/or voice recognition software. As a result, there may be errors in the script that have gone undetected. Please consider this when interpreting information found in this chart.    Hand / Upper Extremity Injection/Arthrocentesis: L extensor compartment 1    Date/Time: 2/24/2025 3:31 PM    Performed by: Carter Peace MD  Authorized by: Carter Peace MD    Indications:  Pain and therapeutic  Needle Size:  25 G  Guidance: ultrasound    Approach:  Radial  Condition: de Quervain's      Site:  L extensor compartment 1  Medications:  6 mg betamethasone acet & sod phos 6 (3-3) MG/ML; 2 mL ROPivacaine 5 MG/ML  Outcome:  Tolerated well, no immediate complications  Procedure discussed: discussed risks, benefits, and alternatives    Consent Given by:  Patient  Timeout: timeout called immediately prior to procedure    Prep: patient was prepped and draped in usual sterile fashion     Ultrasound images of procedure were permanently stored.     Patient reported some improvement of pain after the numbing portion left 1st dorsal compartment steroid injection.  Ultrasound guided images were permanently stored.   Aftercare instructions given to patient.  Plan to follow-up as discussed above.     Carter Peace MD [facility]              Again, thank you for allowing me to participate in the care of your patient.        Sincerely,        Carter Peace MD    Electronically  signed

## 2025-02-24 NOTE — PROGRESS NOTES
ASSESSMENT & PLAN    Jorge Alberto was seen today for pain.    Diagnoses and all orders for this visit:    Left wrist pain  -     XR Wrist Left G/E 3 Views; Future  -     Hand / Upper Extremity Injection/Arthrocentesis: L extensor compartment 1    De Quervain's disease (tenosynovitis)  -     Orthopedic  Referral  -     Hand / Upper Extremity Injection/Arthrocentesis: L extensor compartment 1      This issue is acute on chronic and Worsening.    # Left Wrist De Quervain's Tenosynovitis: Jorge Alberto Solorio  was seen today for left wrist pain. Symptoms had been going on for 8 weeks in the setting of lifting and holding his . On examination there are positive findings of tenderness to palpation over the 1st dorsal compartment, pain with stretching tendon. Imaging findings showed no arthritis. Likely cause of patient's condition due to De Quervain's Tenosynovitis.   Counseled patient on nature of condition and treatment options.  Given this plan as below, follow-up 1 mon as needed.     Image Findings: negative left wrist x-ray  Treatment: Activities as tolerated, home exercises given today, left wrist brace at night  Job: As tolerated  Medications/Injections: Limited tylenol/ibuprofen for pain for 1-2 weeks, Topical Voltaren gel, left De Quervain's steroid injection  Follow-up: In one month if symptoms do not improve, sooner if worsening  Can consider repeat evaluation    Carter Peace MD  [facility] [place]    I was present with the resident during the history and exam.  I discussed the case with the resident and agree with the findings as documented in the assessment and plan.     -----  Chief Complaint   Patient presents with    Left Wrist - Pain       SUBJECTIVE  Jorge Alberto Solorio is a/an 29 year old male who is seen in consultation at the request of  Taz Holm D.O. for evaluation of left wrist pain. Reviewed PCP notes.    The patient is seen by themselves.  The  patient is Right handed    Onset: 4 month(s) ago. Reports insidious onset without acute precipitating event. 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      REVIEW OF SYSTEMS:  Review of Systems  Constitutional, HEENT, cardiovascular, pulmonary, gi and gu systems are negative, except as otherwise noted.    OBJECTIVE:  There were no vitals taken for this visit.   General: healthy, alert and in no distress  HEENT: no scleral icterus or conjunctival erythema  Skin: no suspicious lesions or rash. No jaundice.  CV: distal perfusion intact    Resp: normal respiratory effort without conversational dyspnea   Psych: normal mood and affect  Gait: normal steady gait with appropriate coordination and balance    Neuro: Normal light sensory exam of left upper extremity     Ortho Exam   LEFT WRIST  Inspection:    No swelling or obvious deformity or asymmetry  Palpation:    Tender about the 1st dorsal compartment. Remainder of bony and ligamentous line marks are nontender.     Metacarpals: normal    Thumb: normal    Fingers: normal  Range of Motion:    Full (active and passive) flexion, extension, pronation/supination, and ulnar/radial deviation.  Strength:    No deficits in flexion, extension, ulnar/radial deviation, or  strength.. Normal pinch strength.  Special Tests:    Positive: Finkelstein's    Negative: Tinel's (carpal tunnel), Tinel's (cubital tunnel), CMC grind, thenar eminence wasting, hypothenar eminence wasting.     RADIOLOGY:  I independently ordered, visualized and reviewed these images  with the patient    Left wrist x-rays negative      Review of external notes as documented elsewhere in note  Review of the result(s) of each unique test - left wrist x-rays        Disclaimer: This note consists of symbols derived from keyboarding, dictation and/or voice recognition software. As a result, there may be errors in the script that have gone undetected. Please consider this when interpreting information found in this chart.    Hand / Upper Extremity Injection/Arthrocentesis: L extensor compartment 1    Date/Time: 2/24/2025 3:31 PM    Performed by: Carter Peace MD  Authorized by: Carter Peace MD    Indications:  Pain and therapeutic  Needle Size:  25 G  Guidance: ultrasound    Approach:  Radial  Condition: de Quervain's      Site:  L extensor compartment 1  Medications:  6 mg betamethasone acet & sod phos 6 (3-3) MG/ML; 2 mL ROPivacaine 5 MG/ML  Outcome:  Tolerated well, no immediate complications  Procedure discussed: discussed risks, benefits, and alternatives    Consent Given by:  Patient  Timeout: timeout called immediately prior to procedure    Prep: patient was prepped and draped in usual sterile fashion     Ultrasound images of procedure were permanently stored.     Patient reported some improvement of pain after the numbing portion left 1st dorsal compartment steroid injection.  Ultrasound guided images were permanently stored.   Aftercare instructions given to patient.  Plan to follow-up as discussed above.     Carter Peace MD [facility]

## 2025-02-27 RX ORDER — BETAMETHASONE SODIUM PHOSPHATE AND BETAMETHASONE ACETATE 3; 3 MG/ML; MG/ML
6 INJECTION, SUSPENSION INTRA-ARTICULAR; INTRALESIONAL; INTRAMUSCULAR; SOFT TISSUE
Status: COMPLETED | OUTPATIENT
Start: 2025-02-24 | End: 2025-02-24

## 2025-02-27 RX ORDER — ROPIVACAINE HYDROCHLORIDE 5 MG/ML
2 INJECTION, SOLUTION EPIDURAL; INFILTRATION; PERINEURAL
Status: COMPLETED | OUTPATIENT
Start: 2025-02-24 | End: 2025-02-24

## 2025-04-08 ENCOUNTER — TRANSFERRED RECORDS (OUTPATIENT)
Dept: HEALTH INFORMATION MANAGEMENT | Facility: CLINIC | Age: 30
End: 2025-04-08
Payer: COMMERCIAL

## 2025-04-09 ENCOUNTER — LAB (OUTPATIENT)
Dept: LAB | Facility: CLINIC | Age: 30
End: 2025-04-09
Payer: COMMERCIAL

## 2025-04-09 DIAGNOSIS — L70.9 ACNE, UNSPECIFIED ACNE TYPE: Primary | ICD-10-CM

## 2025-04-09 LAB
ALBUMIN SERPL BCG-MCNC: 4.6 G/DL (ref 3.5–5.2)
ALP SERPL-CCNC: 69 U/L (ref 40–150)
ALT SERPL W P-5'-P-CCNC: 35 U/L (ref 0–70)
ANION GAP SERPL CALCULATED.3IONS-SCNC: 10 MMOL/L (ref 7–15)
AST SERPL W P-5'-P-CCNC: 51 U/L (ref 0–45)
BILIRUB SERPL-MCNC: 0.6 MG/DL
BUN SERPL-MCNC: 15.9 MG/DL (ref 6–20)
CALCIUM SERPL-MCNC: 9.6 MG/DL (ref 8.8–10.4)
CHLORIDE SERPL-SCNC: 103 MMOL/L (ref 98–107)
CHOLEST SERPL-MCNC: 235 MG/DL
CREAT SERPL-MCNC: 1.3 MG/DL (ref 0.67–1.17)
EGFRCR SERPLBLD CKD-EPI 2021: 76 ML/MIN/1.73M2
FASTING STATUS PATIENT QL REPORTED: YES
FASTING STATUS PATIENT QL REPORTED: YES
GLUCOSE SERPL-MCNC: 87 MG/DL (ref 70–99)
HCO3 SERPL-SCNC: 27 MMOL/L (ref 22–29)
HDLC SERPL-MCNC: 50 MG/DL
LDLC SERPL CALC-MCNC: 172 MG/DL
NONHDLC SERPL-MCNC: 185 MG/DL
POTASSIUM SERPL-SCNC: 4 MMOL/L (ref 3.4–5.3)
PROT SERPL-MCNC: 7.4 G/DL (ref 6.4–8.3)
SODIUM SERPL-SCNC: 140 MMOL/L (ref 135–145)
TRIGL SERPL-MCNC: 65 MG/DL

## 2025-04-09 PROCEDURE — 36415 COLL VENOUS BLD VENIPUNCTURE: CPT

## 2025-04-09 PROCEDURE — 80061 LIPID PANEL: CPT

## 2025-04-09 PROCEDURE — 80053 COMPREHEN METABOLIC PANEL: CPT

## 2025-04-09 NOTE — PROGRESS NOTES
Jorge Alberto Bingham has a lab appointment with us today 04/09. The note says lipid test for accutane. Please review and place orders if needed.  Thank you,  [facility]

## 2025-04-21 SDOH — HEALTH STABILITY: PHYSICAL HEALTH: ON AVERAGE, HOW MANY MINUTES DO YOU ENGAGE IN EXERCISE AT THIS LEVEL?: 150+ MIN

## 2025-04-21 SDOH — HEALTH STABILITY: PHYSICAL HEALTH: ON AVERAGE, HOW MANY DAYS PER WEEK DO YOU ENGAGE IN MODERATE TO STRENUOUS EXERCISE (LIKE A BRISK WALK)?: 4 DAYS

## 2025-04-21 ASSESSMENT — SOCIAL DETERMINANTS OF HEALTH (SDOH): HOW OFTEN DO YOU GET TOGETHER WITH FRIENDS OR RELATIVES?: ONCE A WEEK

## 2025-04-22 ENCOUNTER — OFFICE VISIT (OUTPATIENT)
Dept: FAMILY MEDICINE | Facility: CLINIC | Age: 30
End: 2025-04-22
Payer: COMMERCIAL

## 2025-04-22 VITALS
HEART RATE: 68 BPM | TEMPERATURE: 98.2 F | OXYGEN SATURATION: 98 % | BODY MASS INDEX: 32.64 KG/M2 | HEIGHT: 70 IN | WEIGHT: 228 LBS | RESPIRATION RATE: 18 BRPM | DIASTOLIC BLOOD PRESSURE: 70 MMHG | SYSTOLIC BLOOD PRESSURE: 120 MMHG

## 2025-04-22 DIAGNOSIS — Z00.00 ROUTINE GENERAL MEDICAL EXAMINATION AT A HEALTH CARE FACILITY: Primary | ICD-10-CM

## 2025-04-22 DIAGNOSIS — F41.9 ANXIETY: ICD-10-CM

## 2025-04-22 DIAGNOSIS — R74.8 ELEVATED CK: ICD-10-CM

## 2025-04-22 DIAGNOSIS — E78.5 HYPERLIPIDEMIA LDL GOAL <100: ICD-10-CM

## 2025-04-22 PROCEDURE — 99395 PREV VISIT EST AGE 18-39: CPT | Performed by: FAMILY MEDICINE

## 2025-04-22 RX ORDER — ISOTRETINOIN 40 MG/1
40 CAPSULE ORAL DAILY
COMMUNITY
Start: 2025-04-15

## 2025-04-22 ASSESSMENT — ANXIETY QUESTIONNAIRES
7. FEELING AFRAID AS IF SOMETHING AWFUL MIGHT HAPPEN: SEVERAL DAYS
3. WORRYING TOO MUCH ABOUT DIFFERENT THINGS: SEVERAL DAYS
4. TROUBLE RELAXING: NOT AT ALL
6. BECOMING EASILY ANNOYED OR IRRITABLE: NOT AT ALL
GAD7 TOTAL SCORE: 3
7. FEELING AFRAID AS IF SOMETHING AWFUL MIGHT HAPPEN: SEVERAL DAYS
5. BEING SO RESTLESS THAT IT IS HARD TO SIT STILL: NOT AT ALL
GAD7 TOTAL SCORE: 3
8. IF YOU CHECKED OFF ANY PROBLEMS, HOW DIFFICULT HAVE THESE MADE IT FOR YOU TO DO YOUR WORK, TAKE CARE OF THINGS AT HOME, OR GET ALONG WITH OTHER PEOPLE?: SOMEWHAT DIFFICULT
IF YOU CHECKED OFF ANY PROBLEMS ON THIS QUESTIONNAIRE, HOW DIFFICULT HAVE THESE PROBLEMS MADE IT FOR YOU TO DO YOUR WORK, TAKE CARE OF THINGS AT HOME, OR GET ALONG WITH OTHER PEOPLE: SOMEWHAT DIFFICULT
2. NOT BEING ABLE TO STOP OR CONTROL WORRYING: NOT AT ALL
GAD7 TOTAL SCORE: 3
1. FEELING NERVOUS, ANXIOUS, OR ON EDGE: SEVERAL DAYS

## 2025-04-22 ASSESSMENT — PATIENT HEALTH QUESTIONNAIRE - PHQ9
10. IF YOU CHECKED OFF ANY PROBLEMS, HOW DIFFICULT HAVE THESE PROBLEMS MADE IT FOR YOU TO DO YOUR WORK, TAKE CARE OF THINGS AT HOME, OR GET ALONG WITH OTHER PEOPLE: SOMEWHAT DIFFICULT
SUM OF ALL RESPONSES TO PHQ QUESTIONS 1-9: 1
SUM OF ALL RESPONSES TO PHQ QUESTIONS 1-9: 1

## 2025-04-22 ASSESSMENT — PAIN SCALES - GENERAL: PAINLEVEL_OUTOF10: MODERATE PAIN (4)

## 2025-04-22 NOTE — PATIENT INSTRUCTIONS
Patient Education     Continue on sertraline 50 mg daily.     Check CK with labs in a couple weeks.     Let me know if cholesterol remains elevated or if creatinine remains elevated.     Preventive Care Advice   This is general advice given by our system to help you stay healthy. However, your care team may have specific advice just for you. Please talk to your care team about your preventive care needs.  Nutrition  Eat 5 or more servings of fruits and vegetables each day.  Try wheat bread, brown rice and whole grain pasta (instead of white bread, rice, and pasta).  Get enough calcium and vitamin D. Check the label on foods and aim for 100% of the RDA (recommended daily allowance).  Lifestyle  Exercise at least 150 minutes each week  (30 minutes a day, 5 days a week).  Do muscle strengthening activities 2 days a week. These help control your weight and prevent disease.  No smoking.  Wear sunscreen to prevent skin cancer.  Have a dental exam and cleaning every 6 months.  Yearly exams  See your health care team every year to talk about:  Any changes in your health.  Any medicines your care team has prescribed.  Preventive care, family planning, and ways to prevent chronic diseases.  Shots (vaccines)   HPV shots (up to age 26), if you've never had them before.  Hepatitis B shots (up to age 59), if you've never had them before.  COVID-19 shot: Get this shot when it's due.  Flu shot: Get a flu shot every year.  Tetanus shot: Get a tetanus shot every 10 years.  Pneumococcal, hepatitis A, and RSV shots: Ask your care team if you need these based on your risk.  Shingles shot (for age 50 and up)  General health tests  Diabetes screening:  Starting at age 35, Get screened for diabetes at least every 3 years.  If you are younger than age 35, ask your care team if you should be screened for diabetes.  Cholesterol test: At age 39, start having a cholesterol test every 5 years, or more often if advised.  Bone density scan (DEXA):  At age 50, ask your care team if you should have this scan for osteoporosis (brittle bones).  Hepatitis C: Get tested at least once in your life.  STIs (sexually transmitted infections)  Before age 24: Ask your care team if you should be screened for STIs.  After age 24: Get screened for STIs if you're at risk. You are at risk for STIs (including HIV) if:  You are sexually active with more than one person.  You don't use condoms every time.  You or a partner was diagnosed with a sexually transmitted infection.  If you are at risk for HIV, ask about PrEP medicine to prevent HIV.  Get tested for HIV at least once in your life, whether you are at risk for HIV or not.  Cancer screening tests  Cervical cancer screening: If you have a cervix, begin getting regular cervical cancer screening tests starting at age 21.  Breast cancer scan (mammogram): If you've ever had breasts, begin having regular mammograms starting at age 40. This is a scan to check for breast cancer.  Colon cancer screening: It is important to start screening for colon cancer at age 45.  Have a colonoscopy test every 10 years (or more often if you're at risk) Or, ask your provider about stool tests like a FIT test every year or Cologuard test every 3 years.  To learn more about your testing options, visit:   .  For help making a decision, visit:   https://bit.ly/ic72562.  Prostate cancer screening test: If you have a prostate, ask your care team if a prostate cancer screening test (PSA) at age 55 is right for you.  Lung cancer screening: If you are a current or former smoker ages 50 to 80, ask your care team if ongoing lung cancer screenings are right for you.  For informational purposes only. Not to replace the advice of your health care provider. Copyright   2023 [employer]. All rights reserved. Clinically reviewed by the [facility]. [employer] 324282 - REV 01/24.

## 2025-04-22 NOTE — PROGRESS NOTES
"Preventive Care Visit  [facility]  Taz Holm DO, Family Medicine  Apr 22, 2025      Assessment & Plan     Routine general medical examination at a health care facility  Overall doing well.     Anxiety  Stable on sertraline 50 mg daily. Mood is good. Has hydroxyzine available as needed but very rare use of this. Continue on sertraline 50 mg daily. Refill provided.   - sertraline (ZOLOFT) 50 MG tablet  Dispense: 90 tablet; Refill: 3    Elevated CK  Elevated in the past, very active with exercise and lots of muscle mass. Plan to recheck with next lab draw. Advised to not workout day prior or day of lab draw.   - CK total    Hyperlipidemia LDL goal <100  Recent cholesterol with LDL of 172.   Active with exercise.   Discussed mediterranean diet and decreasing calories, weight loss.   Plans to get this checked monthly while on accutan and going to make lifestyle changes at this time.       Patient has been advised of split billing requirements and indicates understanding: Yes      The risks, benefits and treatment options of prescribed medications or other treatments have been discussed with the patient. The patient verbalized their understanding and should call or follow up if no improvement or if they develop further problems.      BMI  Estimated body mass index is 33.01 kg/m  as calculated from the following:    Height as of this encounter: 1.77 m (5' 9.69\").    Weight as of this encounter: 103.4 kg (228 lb).   Weight management plan: Discussed healthy diet and exercise guidelines    Counseling  Appropriate preventive services were addressed with this patient via screening, questionnaire, or discussion as appropriate for fall prevention, nutrition, physical activity, Tobacco-use cessation, social engagement, weight loss and cognition.  Checklist reviewing preventive services available has been given to the patient.  Reviewed patient's diet, addressing concerns and/or questions. "       Subjective   Jorge Alberto is a 29 year old, presenting for the following:  Physical and Lipids        4/22/2025    12:44 PM   Additional Questions   Roomed by Felisha DEGROOT    29 year old male who presents for annual exam and discuss recent labs.       Anxiety  Overall doing well on sertraline 50 mg daily. Decreased this from 100 mg a few weeks ago. Recently had a baby 4 months ago and had some increased stress with this.   Has not been using the hydroxyzine for the past 4 months. Gets a little groggy with this in the AM.       Recent labs with elevated cholesterol.   Has a degree in nutrition, working on making dietary changes and staying active. Will continue to have this monitor monthly by Dermatology and will let me know if worsening.   Active with exercise and no issues at this time.     Acne  On accutane 40 mg daily.   Follows with [facility].   Getting CMP and lipids monitored monthly.       Advance Care Planning    Discussed advance care planning with patient; informed [facility] has link to [facility].        4/21/2025   General Health   How would you rate your overall physical health? Good   Feel stress (tense, anxious, or unable to sleep) Only a little   (!) STRESS CONCERN      4/21/2025   Nutrition   Three or more servings of calcium each day? Yes   Diet: Regular (no restrictions)    Low fat/cholesterol   How many servings of fruit and vegetables per day? 4 or more   How many sweetened beverages each day? 0-1       Multiple values from one day are sorted in reverse-chronological order         4/21/2025   Exercise   Days per week of moderate/strenous exercise 4 days   Average minutes spent exercising at this level 150+ min         4/21/2025   Social Factors   Frequency of gathering with friends or relatives Once a week   Worry food won't last until get money to buy more No   Food not last or not have enough money for food? No   Do you have housing? (Housing is defined as stable  "permanent housing and does not include staying ouside in a car, in a tent, in an abandoned building, in an overnight shelter, or couch-surfing.) Yes   Are you worried about losing your housing? No   Lack of transportation? No   Unable to get utilities (heat,electricity)? No         4/21/2025   Dental   Dentist two times every year? Yes       Today's PHQ-9 Score:       4/22/2025    12:37 PM   PHQ-9 SCORE   PHQ-9 Total Score MyChart 1 (Minimal depression)   PHQ-9 Total Score 1        Patient-reported         4/21/2025   Substance Use   Alcohol more than 3/day or more than 7/wk No   Do you use any other substances recreationally? No     Social History     Tobacco Use    Smoking status: Never     Passive exposure: Never    Smokeless tobacco: Never   Vaping Use    Vaping status: Never Used   Substance Use Topics    Alcohol use: Yes     Comment: occ    Drug use: Never           4/21/2025   STI Screening   New sexual partner(s) since last STI/HIV test? No         4/21/2025   Contraception/Family Planning   Questions about contraception or family planning No        Reviewed and updated as needed this visit by Provider   Tobacco  Allergies  Meds  Problems  Med Hx  Surg Hx  Fam Hx              Review of Systems  Constitutional, HEENT, cardiovascular, pulmonary, gi and gu systems are negative, except as otherwise noted.     Objective    Exam  /70 (BP Location: Right arm, Patient Position: Chair, Cuff Size: Adult Large)   Pulse 68   Temp 98.2  F (36.8  C) (Oral)   Resp 18   Ht 1.77 m (5' 9.69\")   Wt 103.4 kg (228 lb)   SpO2 98%   BMI 33.01 kg/m     Estimated body mass index is 33.01 kg/m  as calculated from the following:    Height as of this encounter: 1.77 m (5' 9.69\").    Weight as of this encounter: 103.4 kg (228 lb).    Physical Exam  GENERAL: alert and no distress  EYES: Eyes grossly normal to inspection, PERRL and conjunctivae and sclerae normal  HENT: ear canals and TM's normal, nose and mouth " without ulcers or lesions  NECK: no adenopathy, no asymmetry, masses, or scars  RESP: lungs clear to auscultation - no rales, rhonchi or wheezes  CV: regular rate and rhythm, normal S1 S2, no S3 or S4, no murmur, click or rub, no peripheral edema  ABDOMEN: soft, nontender, no hepatosplenomegaly, no masses and bowel sounds normal  MS: no gross musculoskeletal defects noted, no edema  SKIN: no suspicious lesions or rashes  NEURO: Normal strength and tone, mentation intact and speech normal  PSYCH: mentation appears normal, affect normal/bright        Signed Electronically by: Taz Holm,

## 2025-05-07 ENCOUNTER — LAB (OUTPATIENT)
Dept: LAB | Facility: CLINIC | Age: 30
End: 2025-05-07
Payer: COMMERCIAL

## 2025-05-07 DIAGNOSIS — L70.0 ACNE VULGARIS: ICD-10-CM

## 2025-05-07 DIAGNOSIS — R74.8 ELEVATED CK: ICD-10-CM

## 2025-05-07 LAB
ALBUMIN SERPL BCG-MCNC: 4.6 G/DL (ref 3.5–5.2)
ALP SERPL-CCNC: 69 U/L (ref 40–150)
ALT SERPL W P-5'-P-CCNC: 41 U/L (ref 0–70)
ANION GAP SERPL CALCULATED.3IONS-SCNC: 10 MMOL/L (ref 7–15)
AST SERPL W P-5'-P-CCNC: 39 U/L (ref 0–45)
BILIRUB SERPL-MCNC: 0.5 MG/DL
BUN SERPL-MCNC: 12.8 MG/DL (ref 6–20)
CALCIUM SERPL-MCNC: 9.7 MG/DL (ref 8.8–10.4)
CHLORIDE SERPL-SCNC: 101 MMOL/L (ref 98–107)
CHOLEST SERPL-MCNC: 231 MG/DL
CK SERPL-CCNC: 555 U/L (ref 39–308)
CREAT SERPL-MCNC: 1.26 MG/DL (ref 0.67–1.17)
EGFRCR SERPLBLD CKD-EPI 2021: 78 ML/MIN/1.73M2
FASTING STATUS PATIENT QL REPORTED: YES
FASTING STATUS PATIENT QL REPORTED: YES
GLUCOSE SERPL-MCNC: 86 MG/DL (ref 70–99)
HCO3 SERPL-SCNC: 29 MMOL/L (ref 22–29)
HDLC SERPL-MCNC: 45 MG/DL
LDLC SERPL CALC-MCNC: 168 MG/DL
NONHDLC SERPL-MCNC: 186 MG/DL
POTASSIUM SERPL-SCNC: 3.8 MMOL/L (ref 3.4–5.3)
PROT SERPL-MCNC: 7.6 G/DL (ref 6.4–8.3)
SODIUM SERPL-SCNC: 140 MMOL/L (ref 135–145)
TRIGL SERPL-MCNC: 91 MG/DL

## 2025-05-07 PROCEDURE — 82550 ASSAY OF CK (CPK): CPT

## 2025-05-07 PROCEDURE — 80061 LIPID PANEL: CPT

## 2025-05-07 PROCEDURE — 36415 COLL VENOUS BLD VENIPUNCTURE: CPT

## 2025-05-07 PROCEDURE — 80053 COMPREHEN METABOLIC PANEL: CPT

## 2025-05-08 ENCOUNTER — MYC MEDICAL ADVICE (OUTPATIENT)
Dept: FAMILY MEDICINE | Facility: CLINIC | Age: 30
End: 2025-05-08
Payer: COMMERCIAL

## 2025-05-12 ENCOUNTER — RESULTS FOLLOW-UP (OUTPATIENT)
Dept: FAMILY MEDICINE | Facility: CLINIC | Age: 30
End: 2025-05-12
Payer: COMMERCIAL

## 2025-05-12 DIAGNOSIS — R74.8 ELEVATED CK: Primary | ICD-10-CM

## 2025-06-12 ENCOUNTER — LAB (OUTPATIENT)
Dept: LAB | Facility: CLINIC | Age: 30
End: 2025-06-12
Payer: COMMERCIAL

## 2025-06-12 DIAGNOSIS — L70.0 ACNE VULGARIS: ICD-10-CM

## 2025-06-12 LAB
ALBUMIN SERPL BCG-MCNC: 4.6 G/DL (ref 3.5–5.2)
ALP SERPL-CCNC: 73 U/L (ref 40–150)
ALT SERPL W P-5'-P-CCNC: 34 U/L (ref 0–70)
ANION GAP SERPL CALCULATED.3IONS-SCNC: 11 MMOL/L (ref 7–15)
AST SERPL W P-5'-P-CCNC: 46 U/L (ref 0–45)
BILIRUB SERPL-MCNC: 0.5 MG/DL
BUN SERPL-MCNC: 16.6 MG/DL (ref 6–20)
CALCIUM SERPL-MCNC: 9.7 MG/DL (ref 8.8–10.4)
CHLORIDE SERPL-SCNC: 103 MMOL/L (ref 98–107)
CHOLEST SERPL-MCNC: 229 MG/DL
CREAT SERPL-MCNC: 1.24 MG/DL (ref 0.67–1.17)
EGFRCR SERPLBLD CKD-EPI 2021: 80 ML/MIN/1.73M2
FASTING STATUS PATIENT QL REPORTED: YES
FASTING STATUS PATIENT QL REPORTED: YES
GLUCOSE SERPL-MCNC: 84 MG/DL (ref 70–99)
HCO3 SERPL-SCNC: 26 MMOL/L (ref 22–29)
HDLC SERPL-MCNC: 45 MG/DL
LDLC SERPL CALC-MCNC: 169 MG/DL
NONHDLC SERPL-MCNC: 184 MG/DL
POTASSIUM SERPL-SCNC: 4 MMOL/L (ref 3.4–5.3)
PROT SERPL-MCNC: 7.4 G/DL (ref 6.4–8.3)
SODIUM SERPL-SCNC: 140 MMOL/L (ref 135–145)
TRIGL SERPL-MCNC: 74 MG/DL

## 2025-06-16 ENCOUNTER — TRANSFERRED RECORDS (OUTPATIENT)
Dept: HEALTH INFORMATION MANAGEMENT | Facility: CLINIC | Age: 30
End: 2025-06-16
Payer: COMMERCIAL

## 2025-07-22 ENCOUNTER — TRANSFERRED RECORDS (OUTPATIENT)
Dept: HEALTH INFORMATION MANAGEMENT | Facility: CLINIC | Age: 30
End: 2025-07-22
Payer: COMMERCIAL

## 2025-08-12 ENCOUNTER — LAB (OUTPATIENT)
Dept: LAB | Facility: CLINIC | Age: 30
End: 2025-08-12
Payer: COMMERCIAL

## 2025-08-12 DIAGNOSIS — R74.8 ELEVATED CK: ICD-10-CM

## 2025-08-12 DIAGNOSIS — L70.0 ACNE VULGARIS: ICD-10-CM

## 2025-08-12 LAB
ALBUMIN SERPL BCG-MCNC: 4.8 G/DL (ref 3.5–5.2)
ALP SERPL-CCNC: 78 U/L (ref 40–150)
ALT SERPL W P-5'-P-CCNC: 29 U/L (ref 0–70)
ANION GAP SERPL CALCULATED.3IONS-SCNC: 13 MMOL/L (ref 7–15)
AST SERPL W P-5'-P-CCNC: 41 U/L (ref 0–45)
BILIRUB SERPL-MCNC: 0.5 MG/DL
BUN SERPL-MCNC: 14.9 MG/DL (ref 6–20)
CALCIUM SERPL-MCNC: 9.5 MG/DL (ref 8.8–10.4)
CHLORIDE SERPL-SCNC: 100 MMOL/L (ref 98–107)
CHOLEST SERPL-MCNC: 250 MG/DL
CK SERPL-CCNC: 523 U/L (ref 39–308)
CREAT SERPL-MCNC: 1.29 MG/DL (ref 0.67–1.17)
EGFRCR SERPLBLD CKD-EPI 2021: 76 ML/MIN/1.73M2
FASTING STATUS PATIENT QL REPORTED: YES
FASTING STATUS PATIENT QL REPORTED: YES
GLUCOSE SERPL-MCNC: 101 MG/DL (ref 70–99)
HCO3 SERPL-SCNC: 24 MMOL/L (ref 22–29)
HDLC SERPL-MCNC: 45 MG/DL
LDLC SERPL CALC-MCNC: 179 MG/DL
NONHDLC SERPL-MCNC: 205 MG/DL
POTASSIUM SERPL-SCNC: 4.2 MMOL/L (ref 3.4–5.3)
PROT SERPL-MCNC: 7.8 G/DL (ref 6.4–8.3)
SODIUM SERPL-SCNC: 137 MMOL/L (ref 135–145)
TRIGL SERPL-MCNC: 132 MG/DL

## 2025-08-12 PROCEDURE — 80061 LIPID PANEL: CPT

## 2025-08-12 PROCEDURE — 82550 ASSAY OF CK (CPK): CPT

## 2025-08-12 PROCEDURE — 80053 COMPREHEN METABOLIC PANEL: CPT

## 2025-08-12 PROCEDURE — 36415 COLL VENOUS BLD VENIPUNCTURE: CPT

## 2025-08-15 ENCOUNTER — RESULTS FOLLOW-UP (OUTPATIENT)
Dept: FAMILY MEDICINE | Facility: CLINIC | Age: 30
End: 2025-08-15
Payer: COMMERCIAL

## 2025-08-22 ENCOUNTER — TRANSFERRED RECORDS (OUTPATIENT)
Dept: HEALTH INFORMATION MANAGEMENT | Facility: CLINIC | Age: 30
End: 2025-08-22
Payer: COMMERCIAL